# Patient Record
Sex: FEMALE | Race: WHITE | NOT HISPANIC OR LATINO | Employment: FULL TIME | URBAN - METROPOLITAN AREA
[De-identification: names, ages, dates, MRNs, and addresses within clinical notes are randomized per-mention and may not be internally consistent; named-entity substitution may affect disease eponyms.]

---

## 2018-04-23 ENCOUNTER — TRANSCRIBE ORDERS (OUTPATIENT)
Dept: ADMINISTRATIVE | Age: 28
End: 2018-04-23

## 2018-04-23 ENCOUNTER — APPOINTMENT (OUTPATIENT)
Dept: LAB | Age: 28
End: 2018-04-23
Attending: PREVENTIVE MEDICINE

## 2018-04-23 DIAGNOSIS — Z02.1 PHYSICAL EXAM, PRE-EMPLOYMENT: ICD-10-CM

## 2018-04-23 DIAGNOSIS — Z02.1 PHYSICAL EXAM, PRE-EMPLOYMENT: Primary | ICD-10-CM

## 2018-04-23 PROCEDURE — 36415 COLL VENOUS BLD VENIPUNCTURE: CPT

## 2018-04-23 PROCEDURE — 86480 TB TEST CELL IMMUN MEASURE: CPT

## 2018-04-23 PROCEDURE — 86787 VARICELLA-ZOSTER ANTIBODY: CPT

## 2018-04-24 LAB — VZV IGG SER IA-ACNC: NORMAL

## 2018-04-25 LAB
ANNOTATION COMMENT IMP: NORMAL
GAMMA INTERFERON BACKGROUND BLD IA-ACNC: 0.1 IU/ML
M TB IFN-G BLD-IMP: NEGATIVE
M TB IFN-G CD4+ BCKGRND COR BLD-ACNC: <0.01 IU/ML
M TB IFN-G CD4+ T-CELLS BLD-ACNC: 0.06 IU/ML
MITOGEN IGNF BLD-ACNC: 9.86 IU/ML
QUANTIFERON-TB GOLD IN TUBE: NORMAL
SERVICE CMNT-IMP: NORMAL

## 2018-06-05 ENCOUNTER — OFFICE VISIT (OUTPATIENT)
Dept: OBGYN CLINIC | Facility: CLINIC | Age: 28
End: 2018-06-05
Payer: COMMERCIAL

## 2018-06-05 VITALS
HEIGHT: 62 IN | DIASTOLIC BLOOD PRESSURE: 70 MMHG | SYSTOLIC BLOOD PRESSURE: 124 MMHG | WEIGHT: 144 LBS | BODY MASS INDEX: 26.5 KG/M2

## 2018-06-05 DIAGNOSIS — Z30.41 ENCOUNTER FOR SURVEILLANCE OF CONTRACEPTIVE PILLS: Primary | ICD-10-CM

## 2018-06-05 PROCEDURE — 99202 OFFICE O/P NEW SF 15 MIN: CPT | Performed by: OBSTETRICS & GYNECOLOGY

## 2018-06-05 RX ORDER — NORGESTIMATE AND ETHINYL ESTRADIOL 0.25-0.035
1 KIT ORAL DAILY
COMMUNITY
End: 2018-06-05 | Stop reason: SDUPTHER

## 2018-06-05 RX ORDER — NORGESTIMATE AND ETHINYL ESTRADIOL 0.25-0.035
1 KIT ORAL DAILY
Qty: 84 TABLET | Refills: 2 | Status: SHIPPED | OUTPATIENT
Start: 2018-06-05 | End: 2018-08-23 | Stop reason: SDUPTHER

## 2018-06-05 NOTE — PROGRESS NOTES
Assessment Diagnoses and all orders for this visit:    Encounter for surveillance of contraceptive pills  -     norgestimate-ethinyl estradiol (ORTHO-CYCLEN) 0 25-35 MG-MCG per tablet; Take 1 tablet by mouth daily    Other orders  -     Discontinue: norgestimate-ethinyl estradiol (ORTHO-CYCLEN) 0 25-35 MG-MCG per tablet; Take 1 tablet by mouth daily        Plan  32 y o  female continuing OCP (estrogen/progesterone), no contraindications  Return to office in 5 month(s) for annual exam        Michael Mancini is a 32 y o  female who presents for contraception follow up  Her current contraception is oral contraceptives (estrogen/progesterone)  The patient is happy with this method  She denies headache, nausea, abnormal bleeding and abnormal discharge  The patient is sexually active  She is using birth control for aub and contraception  She gets a headache on the first day of her period, but it goes away without a problem by day 2      h/o abnl pap 2016 - colpo done  Repeat pap 2017 was normal       There is no problem list on file for this patient  Past Medical History:   Diagnosis Date    Abnormal Pap smear of cervix 2016    Kidney infection     Kidney stone     Migraine     with menses    Ovarian cyst        Past Surgical History:   Procedure Laterality Date    BLADDER SURGERY      CARPAL TUNNEL RELEASE      WISDOM TOOTH EXTRACTION         Family History   Problem Relation Age of Onset    Colon cancer Mother     Ovarian cancer Maternal Grandmother     Colon cancer Maternal Grandfather     Heart disease Paternal Grandfather        Social History     Social History    Marital status: Single     Spouse name: N/A    Number of children: N/A    Years of education: N/A     Occupational History    Not on file       Social History Main Topics    Smoking status: Never Smoker    Smokeless tobacco: Never Used    Alcohol use Yes      Comment: socially    Drug use: No    Sexual activity: Yes     Partners: Male     Birth control/ protection: OCP     Other Topics Concern    Not on file     Social History Narrative    No narrative on file          Current Outpatient Prescriptions:     norgestimate-ethinyl estradiol (ORTHO-CYCLEN) 0 25-35 MG-MCG per tablet, Take 1 tablet by mouth daily, Disp: 84 tablet, Rfl: 2    Allergies   Allergen Reactions    Codeine     Penicillins        Review of Systems  Constitutional: no fever, feels well, no tiredness, no recent weight gain or loss  Breasts: no complaints of breast pain, breast lump, or nipple discharge  Gastrointestinal: no complaints nausea, vomiting  Genitourinary : as noted in HPI  Neurological: no complaints of headache    Objective     /70   Ht 5' 1 5" (1 562 m)   Wt 65 3 kg (144 lb)   LMP 05/29/2018 (Exact Date)   Breastfeeding? No   BMI 26 77 kg/m²     GEN: The patient was alert and oriented x3, pleasant well-appearing female in no acute distress     Resp: normal respirations, no signs of distress, CTA b/l  Cards: RRR

## 2018-06-15 ENCOUNTER — TRANSCRIBE ORDERS (OUTPATIENT)
Dept: LAB | Facility: AMBULARY SURGERY CENTER | Age: 28
End: 2018-06-15

## 2018-06-15 ENCOUNTER — APPOINTMENT (OUTPATIENT)
Dept: LAB | Facility: AMBULARY SURGERY CENTER | Age: 28
End: 2018-06-15
Payer: COMMERCIAL

## 2018-06-15 DIAGNOSIS — Z00.8 HEALTH EXAMINATION IN POPULATION SURVEYS: ICD-10-CM

## 2018-06-15 DIAGNOSIS — Z00.8 HEALTH EXAMINATION IN POPULATION SURVEYS: Primary | ICD-10-CM

## 2018-06-15 LAB
CHOLEST SERPL-MCNC: 196 MG/DL (ref 50–200)
EST. AVERAGE GLUCOSE BLD GHB EST-MCNC: 74 MG/DL
HBA1C MFR BLD: 4.2 % (ref 4.2–6.3)
HDLC SERPL-MCNC: 53 MG/DL (ref 40–60)
LDLC SERPL CALC-MCNC: 114 MG/DL (ref 0–100)
NONHDLC SERPL-MCNC: 143 MG/DL
TRIGL SERPL-MCNC: 145 MG/DL

## 2018-06-15 PROCEDURE — 83036 HEMOGLOBIN GLYCOSYLATED A1C: CPT

## 2018-06-15 PROCEDURE — 36415 COLL VENOUS BLD VENIPUNCTURE: CPT

## 2018-06-15 PROCEDURE — 80061 LIPID PANEL: CPT

## 2018-07-06 ENCOUNTER — OFFICE VISIT (OUTPATIENT)
Dept: FAMILY MEDICINE CLINIC | Facility: CLINIC | Age: 28
End: 2018-07-06
Payer: COMMERCIAL

## 2018-07-06 ENCOUNTER — APPOINTMENT (OUTPATIENT)
Dept: LAB | Facility: CLINIC | Age: 28
End: 2018-07-06
Payer: COMMERCIAL

## 2018-07-06 ENCOUNTER — TRANSCRIBE ORDERS (OUTPATIENT)
Dept: LAB | Facility: CLINIC | Age: 28
End: 2018-07-06

## 2018-07-06 VITALS
WEIGHT: 148.8 LBS | BODY MASS INDEX: 26.36 KG/M2 | DIASTOLIC BLOOD PRESSURE: 72 MMHG | HEART RATE: 78 BPM | HEIGHT: 63 IN | SYSTOLIC BLOOD PRESSURE: 104 MMHG | RESPIRATION RATE: 16 BRPM

## 2018-07-06 DIAGNOSIS — R53.83 OTHER FATIGUE: ICD-10-CM

## 2018-07-06 DIAGNOSIS — Z00.00 ENCOUNTER FOR WELL ADULT EXAM WITHOUT ABNORMAL FINDINGS: Primary | ICD-10-CM

## 2018-07-06 LAB
25(OH)D3 SERPL-MCNC: 13.9 NG/ML (ref 30–100)
TSH SERPL DL<=0.05 MIU/L-ACNC: 1.7 UIU/ML (ref 0.36–3.74)

## 2018-07-06 PROCEDURE — 99385 PREV VISIT NEW AGE 18-39: CPT | Performed by: FAMILY MEDICINE

## 2018-07-06 PROCEDURE — 82306 VITAMIN D 25 HYDROXY: CPT

## 2018-07-06 PROCEDURE — 36415 COLL VENOUS BLD VENIPUNCTURE: CPT

## 2018-07-06 PROCEDURE — 84443 ASSAY THYROID STIM HORMONE: CPT

## 2018-07-06 NOTE — PROGRESS NOTES
Miguel Mancuso was seen today for new patient physical     Diagnoses and all orders for this visit:    Encounter for well adult exam without abnormal findings    Other fatigue  -     TSH, 3rd generation with T4 reflex; Future  -     Vitamin D 25 hydroxy; Future      Patient Counseling:  --Nutrition: Stressed importance of moderation in sodium/caffeine intake, saturated fat and cholesterol, caloric balance, sufficient intake of fresh fruits, vegetables, fiber, calcium, iron, and 1 mg of folate supplement per day   --Discussed  calcium supplement, and the daily use of baby aspirin  --Exercise: Stressed the importance of regular exercise  --Substance Abuse: Discussed cessation/primary prevention of tobacco, alcohol, or other drug use; driving or other dangerous activities under the influence; availability of treatment for abuse  --Sexuality: Discussed sexually transmitted diseases, partner selection, use of condoms, avoidance of unintended pregnancy  and contraceptive alternatives  --Injury prevention: Discussed safety belts, safety helmets, smoke detector, smoking near bedding or upholstery  --Dental health: Discussed importance of regular tooth brushing, flossing, and dental visits  --Immunizations reviewed  --Discussed benefits of screening colonoscopy    Chief Complaint   Patient presents with    new patient physical       HPI    Patient here for a comprehensive physical exam The patient reports no problems    Do you take any herbs or supplements that were not prescribed by a doctor? no   Are you taking calcium supplements? no   Are you taking aspirin daily? no  How often do you exercise? 3 times a week - cardio and treadmill  Diet? 2-3 servings of fruits and vegetables  Dental visit: March 2018    Vision test: April 2018- wears contacts     Colonoscopy:  Never had one done      History:  LMP: No LMP recorded  Patient is not currently having periods (Reason: Birth Control)    Last pap date:  Seen in [id number]- Dr Maryjo Moise  Abnormal pap? no  : 0  Para:0      History   Sexual Activity    Sexual activity: Yes    Partners: Male    Birth control/ protection: OCP         Review of Systems   Constitutional: Negative  HENT: Negative  Eyes: Negative  Respiratory: Negative  Cardiovascular: Negative  Gastrointestinal: Negative  Endocrine: Negative  Genitourinary: Negative  Musculoskeletal: Negative  Skin: Negative  Allergic/Immunologic: Negative  Neurological: Negative  Hematological: Negative  Psychiatric/Behavioral: Negative  Family History   Problem Relation Age of Onset    Colon cancer Mother     Ovarian cancer Maternal Grandmother     Colon cancer Maternal Grandfather     Heart disease Paternal Grandfather        Past Medical History:   Diagnosis Date    Abnormal Pap smear of cervix 2016    Kidney infection     Kidney stone     Migraine     with menses    Ovarian cyst          Social History     Social History    Marital status: Single     Spouse name: N/A    Number of children: N/A    Years of education: N/A     Occupational History    Not on file  Social History Main Topics    Smoking status: Never Smoker    Smokeless tobacco: Never Used    Alcohol use Yes      Comment: socially    Drug use: No    Sexual activity: Yes     Partners: Male     Birth control/ protection: OCP     Other Topics Concern    Not on file     Social History Narrative    No narrative on file       Current Outpatient Prescriptions on File Prior to Visit   Medication Sig Dispense Refill    norgestimate-ethinyl estradiol (ORTHO-CYCLEN) 0 25-35 MG-MCG per tablet Take 1 tablet by mouth daily 84 tablet 2     No current facility-administered medications on file prior to visit            Allergies   Allergen Reactions    Codeine     Penicillins          Vitals:    18 0848   BP: 104/72   BP Location: Left arm   Patient Position: Sitting   Cuff Size: Standard Pulse: 78   Resp: 16   Weight: 67 5 kg (148 lb 12 8 oz)   Height: 5' 2 6" (1 59 m)         Physical Exam   Constitutional: She is oriented to person, place, and time  Vital signs are normal  She appears well-developed and well-nourished  HENT:   Head: Normocephalic and atraumatic  Nose: Nose normal    Mouth/Throat: Oropharynx is clear and moist    Eyes: Pupils are equal, round, and reactive to light  Neck: Normal range of motion  Neck supple  No thyromegaly present  Cardiovascular: Normal rate and regular rhythm  No murmur heard  Pulmonary/Chest: Effort normal and breath sounds normal    Abdominal: Soft  Bowel sounds are normal    Musculoskeletal: Normal range of motion  She exhibits no edema or deformity  Neurological: She is alert and oriented to person, place, and time  She has normal reflexes  Skin: Skin is warm  No rash noted  No erythema  Psychiatric: She has a normal mood and affect   Her behavior is normal

## 2018-07-09 DIAGNOSIS — E55.9 VITAMIN D DEFICIENCY: Primary | ICD-10-CM

## 2018-07-09 RX ORDER — ERGOCALCIFEROL 1.25 MG/1
50000 CAPSULE ORAL WEEKLY
Qty: 8 CAPSULE | Refills: 0 | Status: SHIPPED | OUTPATIENT
Start: 2018-07-09 | End: 2019-07-31

## 2018-08-23 DIAGNOSIS — Z30.41 ENCOUNTER FOR SURVEILLANCE OF CONTRACEPTIVE PILLS: ICD-10-CM

## 2018-08-24 RX ORDER — NORGESTIMATE AND ETHINYL ESTRADIOL 0.25-0.035
1 KIT ORAL DAILY
Qty: 84 TABLET | Refills: 3 | Status: SHIPPED | OUTPATIENT
Start: 2018-08-24 | End: 2019-07-30 | Stop reason: SDUPTHER

## 2018-08-24 NOTE — TELEPHONE ENCOUNTER
From: Adrinane Gonzalez  Sent: 8/23/2018 5:53 PM EDT  Subject: Medication Renewal Request    Adrianne Gonzalez would like a refill of the following medications:     norgestimate-ethinyl estradiol (ORTHO-CYCLEN) 0 25-35 MG-MCG per tablet AMANDA Post    Preferred pharmacy: [facility] : [id number]    Comment:

## 2018-11-05 ENCOUNTER — ANNUAL EXAM (OUTPATIENT)
Dept: OBGYN CLINIC | Facility: CLINIC | Age: 28
End: 2018-11-05
Payer: COMMERCIAL

## 2018-11-05 VITALS
HEIGHT: 62 IN | SYSTOLIC BLOOD PRESSURE: 110 MMHG | WEIGHT: 156 LBS | DIASTOLIC BLOOD PRESSURE: 60 MMHG | BODY MASS INDEX: 28.71 KG/M2

## 2018-11-05 DIAGNOSIS — Z30.41 ENCOUNTER FOR SURVEILLANCE OF CONTRACEPTIVE PILLS: ICD-10-CM

## 2018-11-05 DIAGNOSIS — R87.613 HIGH GRADE SQUAMOUS INTRAEPITHELIAL LESION (HGSIL) ON CYTOLOGIC SMEAR OF CERVIX: ICD-10-CM

## 2018-11-05 DIAGNOSIS — Z01.419 WOMEN'S ANNUAL ROUTINE GYNECOLOGICAL EXAMINATION: Primary | ICD-10-CM

## 2018-11-05 DIAGNOSIS — Z12.4 ENCOUNTER FOR SCREENING FOR MALIGNANT NEOPLASM OF CERVIX: ICD-10-CM

## 2018-11-05 PROCEDURE — 87624 HPV HI-RISK TYP POOLED RSLT: CPT | Performed by: OBSTETRICS & GYNECOLOGY

## 2018-11-05 PROCEDURE — 99395 PREV VISIT EST AGE 18-39: CPT | Performed by: OBSTETRICS & GYNECOLOGY

## 2018-11-05 PROCEDURE — G0145 SCR C/V CYTO,THINLAYER,RESCR: HCPCS | Performed by: OBSTETRICS & GYNECOLOGY

## 2018-11-05 NOTE — PROGRESS NOTES
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     1   Routine well woman exam done today  2    Pap and HPV:Pap with HPV was done today  Current ASCCP Guidelines reviewed  3   The patient declined STD testing  No testing performed  Safe sex practices have been discussed  4  The patient is sexually active  She agreeed to continue current contraception and options have been discussed  5  The following were reviewed in today's visit: breast self exam, use and side effects of OCPs, exercise and healthy diet  6  Patient to return to office in 12 months for annual exam      All questions have been answered to her satisfaction  CC:  Annual Gynecologic Examination    HPI: 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  She has the following concerns:  None  Health Maintenance:    She exercises 3 days per week  She wears her seatbelt routinely  She does perform regular monthly self breast exams  She feels safe at home  Patients does follow a balanced diet  Past Medical History:   Diagnosis Date    Abnormal Pap smear of cervix 2016    Kidney infection     Kidney stone     Migraine     with menses    Ovarian cyst        Past Surgical History:   Procedure Laterality Date    BLADDER SURGERY      CARPAL TUNNEL RELEASE      WISDOM TOOTH EXTRACTION         Past OB/Gyn History:  Period Cycle (Days): 28  Period Duration (Days): 4  Period Pattern: Regular  Menstrual Flow: Light  Dysmenorrhea: (!) Mild  Dysmenorrhea Symptoms: HeadachePatient's last menstrual period was 10/16/2018      History of sexually transmitted infection No  Patient is currently sexually active: heterosexual  Birth control:oral contraceptives (estrogen/progesterone)    Last Pap  2017 :  no abnormalities;  2016 HSIL    Family History  Family History   Problem Relation Age of Onset    Colon cancer Mother     Ovarian cancer Maternal Grandmother     Colon cancer Maternal Grandfather     Heart disease Paternal Grandfather        Social History:  Social History     Social History    Marital status: Single     Spouse name: N/A    Number of children: N/A    Years of education: N/A     Occupational History    Not on file  Social History Main Topics    Smoking status: Never Smoker    Smokeless tobacco: Never Used    Alcohol use Yes      Comment: socially    Drug use: No    Sexual activity: Yes     Partners: Male     Birth control/ protection: OCP     Other Topics Concern    Not on file     Social History Narrative    No narrative on file     Presently lives with boyfriend  Patient is co-habitating  Patient is currently employed  Allergies: Allergies   Allergen Reactions    Codeine     Penicillins        Medications:    Current Outpatient Prescriptions:     ergocalciferol (VITAMIN D2) 50,000 units, Take 1 capsule (50,000 Units total) by mouth once a week, Disp: 8 capsule, Rfl: 0    norgestimate-ethinyl estradiol (ORTHO-CYCLEN) 0 25-35 MG-MCG per tablet, Take 1 tablet by mouth daily, Disp: 84 tablet, Rfl: 3    Review of Systems:  Review of Systems   Constitutional: Negative for unexpected weight change  Respiratory: Negative for shortness of breath  Cardiovascular: Negative for chest pain  Gastrointestinal: Negative for abdominal pain, blood in stool, constipation, nausea and vomiting  Genitourinary: Negative for difficulty urinating, dyspareunia, dysuria, frequency, menstrual problem, vaginal bleeding and vaginal discharge  Neurological: Positive for headaches (during menses)  Breasts: No changes      Physical Exam:  /60   Ht 5' 2" (1 575 m)   Wt 70 8 kg (156 lb)   LMP 10/16/2018   BMI 28 53 kg/m²    Physical Exam   Constitutional: She is oriented to person, place, and time  She appears well-developed and well-nourished  Genitourinary: Vagina normal and uterus normal  Pelvic exam was performed with patient supine  There is no rash, tenderness or lesion on the right labia  There is no rash, tenderness or lesion on the left labia  Vagina exhibits rugosity  No tenderness or bleeding in the vagina  No vaginal discharge found  Right adnexum does not display mass, does not display tenderness and does not display fullness  Left adnexum does not display mass, does not display tenderness and does not display fullness  Cervix is nulliparous  Cervix does not exhibit motion tenderness, lesion or polyp  Uterus is mobile  Uterus is not enlarged, tender or irregular (is regular)  Neck: Normal range of motion  No thyromegaly present  Cardiovascular: Normal rate and regular rhythm  No murmur heard  Pulmonary/Chest: Effort normal and breath sounds normal  No respiratory distress  She has no wheezes  Right breast exhibits no inverted nipple, no mass, no nipple discharge, no skin change and no tenderness  Left breast exhibits no inverted nipple, no mass, no nipple discharge, no skin change and no tenderness  Abdominal: Soft  She exhibits no distension  There is no tenderness  There is no guarding  Neurological: She is alert and oriented to person, place, and time  Psychiatric: She has a normal mood and affect   Her behavior is normal

## 2018-11-07 LAB
HPV HR 12 DNA CVX QL NAA+PROBE: NEGATIVE
HPV16 DNA CVX QL NAA+PROBE: NEGATIVE
HPV18 DNA CVX QL NAA+PROBE: NEGATIVE

## 2018-11-08 LAB
LAB AP GYN PRIMARY INTERPRETATION: NORMAL
Lab: NORMAL

## 2019-01-08 ENCOUNTER — OFFICE VISIT (OUTPATIENT)
Dept: URGENT CARE | Facility: CLINIC | Age: 29
End: 2019-01-08
Payer: COMMERCIAL

## 2019-01-08 VITALS
RESPIRATION RATE: 12 BRPM | WEIGHT: 158 LBS | SYSTOLIC BLOOD PRESSURE: 136 MMHG | TEMPERATURE: 98.6 F | HEART RATE: 96 BPM | DIASTOLIC BLOOD PRESSURE: 80 MMHG | HEIGHT: 62 IN | BODY MASS INDEX: 29.08 KG/M2

## 2019-01-08 DIAGNOSIS — S61.219A LACERATION WITHOUT FOREIGN BODY OF UNSPECIFIED FINGER WITHOUT DAMAGE TO NAIL, INITIAL ENCOUNTER: Primary | ICD-10-CM

## 2019-01-08 PROCEDURE — S9088 SERVICES PROVIDED IN URGENT: HCPCS | Performed by: NURSE PRACTITIONER

## 2019-01-08 PROCEDURE — 99203 OFFICE O/P NEW LOW 30 MIN: CPT | Performed by: NURSE PRACTITIONER

## 2019-01-08 PROCEDURE — 90715 TDAP VACCINE 7 YRS/> IM: CPT

## 2019-01-08 PROCEDURE — 12001 RPR S/N/AX/GEN/TRNK 2.5CM/<: CPT | Performed by: NURSE PRACTITIONER

## 2019-01-08 RX ORDER — LIDOCAINE HYDROCHLORIDE 10 MG/ML
5 INJECTION, SOLUTION EPIDURAL; INFILTRATION; INTRACAUDAL; PERINEURAL ONCE
Status: COMPLETED | OUTPATIENT
Start: 2019-01-08 | End: 2019-01-08

## 2019-01-08 RX ADMIN — LIDOCAINE HYDROCHLORIDE 5 ML: 10 INJECTION, SOLUTION EPIDURAL; INFILTRATION; INTRACAUDAL; PERINEURAL at 10:39

## 2019-01-08 NOTE — PATIENT INSTRUCTIONS
Sutures to be removed in 10-14 days   Keep wound clean and dry for 24 hours  Avoid submerging wound until sutures are removed   Follow up with your PCP for suture removal     Care For Your Stitches   WHAT YOU NEED TO KNOW:   Stitches, or sutures, are used to close cuts and wounds on the skin  Stitches need to be removed after your wound has healed  DISCHARGE INSTRUCTIONS:   Return to the emergency department if:   · Your stitches come apart  · Blood soaks through your bandages  · You suddenly cannot move your injured joint  · You have sudden numbness around your wound  · You see red streaks coming from your wound  Contact your healthcare provider if:   · You have a fever and chills  · Your wound is red, warm, swollen, or leaking pus  · There is a bad smell coming from your wound  · You have increased pain in the wound area  · You have questions or concerns about your condition or care  Care for your stitches:  Keep your stitches clean and dry  You may need to cover your stitches with a bandage for 24 to 48 hours, or as directed  Do not bump or hit the suture area, as this could open the wound  Do not trim or shorten the ends of your stitches  If they rub on your clothing, put a gauze bandage between the stitches and your clothes  Clean your wound:  Carefully wash your wound with soap and water  For mouth and lip wounds, rinse your mouth after meals and at bedtime  Ask your healthcare provider what to use to rinse your mouth  If you have a scalp wound, you may gently wash your hair every 2 days with mild shampoo  Do not use hair products, such as hair spray  Help your wound heal:   · Elevate  your wound above the level of your heart as often as you can  This will help decrease swelling and pain  Prop your wound on pillows or blankets to keep it elevated comfortably  · Limit activity  Do not stretch the skin around your wound   This will help prevent bleeding and swelling of the wound area  Follow up with your healthcare provider as directed: You may need to return to have your stitches removed  Write down your questions so you remember to ask them during your visits  © 2017 [employer] Information is for End User's use only and may not be sold, redistributed or otherwise used for commercial purposes  All illustrations and images included in CareNotes® are the copyrighted property of [employer]  or [employer]  The above information is an  only  It is not intended as medical advice for individual conditions or treatments  Talk to your doctor, nurse or pharmacist before following any medical regimen to see if it is safe and effective for you

## 2019-01-08 NOTE — PROGRESS NOTES
[facility]        NAME: Elke Ramirez is a 25 y o  female  : 1990    MRN: [id number]  DATE: 2019  TIME: 12:37 PM    Assessment and Plan   Laceration without foreign body of unspecified finger without damage to nail, initial encounter [S61 219A]  1  Laceration without foreign body of unspecified finger without damage to nail, initial encounter  TDAP Vaccine greater than or equal to 6yo    lidocaine (PF) (XYLOCAINE-MPF) 1 % injection 5 mL         Patient Instructions   Sutures to be removed in 10-14 days   Keep wound clean and dry for 24 hours  Avoid submerging wound until sutures are removed   Follow up with your PCP for suture removal     Tetanus updated by RN    Follow up with PCP in 3-5 days  Proceed to  ER if symptoms worsen  Chief Complaint     Chief Complaint   Patient presents with    Hand Laceration     opening package with a knife, R middle finger, unsure of last Tdap         History of Present Illness       24 y/o female presents with right 3rd digit laceration  She reports that she cut herself at approximately 0830 this morning with a pocket knife while trying to open a package  She denies numbness, tingling, or weakness  Bleeding is controlled  Last tetanus was >5 years ago  Review of Systems   Review of Systems   Constitutional: Negative for activity change, chills and fever  Musculoskeletal: Negative for joint swelling  Skin: Positive for wound  Negative for color change  Neurological: Negative for weakness and numbness  Current Medications       Current Outpatient Prescriptions:     ergocalciferol (VITAMIN D2) 50,000 units, Take 1 capsule (50,000 Units total) by mouth once a week, Disp: 8 capsule, Rfl: 0    norgestimate-ethinyl estradiol (ORTHO-CYCLEN) 0 25-35 MG-MCG per tablet, Take 1 tablet by mouth daily, Disp: 84 tablet, Rfl: 3  No current facility-administered medications for this visit       Current Allergies     Allergies as of 01/08/2019 - Reviewed 01/08/2019   Allergen Reaction Noted    Codeine  06/05/2018    Penicillins  06/05/2018            The following portions of the patient's history were reviewed and updated as appropriate: allergies, current medications, past family history, past medical history, past social history, past surgical history and problem list      Past Medical History:   Diagnosis Date    Abnormal Pap smear of cervix 2016    Kidney infection     Kidney stone     Migraine     with menses    Ovarian cyst        Past Surgical History:   Procedure Laterality Date    BLADDER SURGERY      CARPAL TUNNEL RELEASE      WISDOM TOOTH EXTRACTION         Family History   Problem Relation Age of Onset    Colon cancer Mother     Ovarian cancer Maternal Grandmother     Colon cancer Maternal Grandfather     Heart disease Paternal Grandfather          Medications have been verified  Objective   /80 (Patient Position: Sitting)   Pulse 96   Temp 98 6 °F (37 °C) (Oral)   Resp 12   Ht 5' 2" (1 575 m)   Wt 71 7 kg (158 lb)   BMI 28 90 kg/m²        Physical Exam     Physical Exam   Constitutional: She is oriented to person, place, and time  Vital signs are normal  She appears well-developed and well-nourished  She is active  No distress  Cardiovascular: S1 normal, S2 normal and normal heart sounds  Pulmonary/Chest: Breath sounds normal    Musculoskeletal: Normal range of motion  Neurological: She is alert and oriented to person, place, and time  She has normal strength  No sensory deficit  Skin: Skin is warm and dry  Laceration noted  Laceration repair  Date/Time: 1/8/2019 12:40 PM  Performed by: Umu Loza  Authorized by: Umu Loza   Consent: Verbal consent obtained    Consent given by: patient  Patient understanding: patient states understanding of the procedure being performed  Patient identity confirmed: verbally with patient  Body area: upper extremity  Location details: right long finger  Laceration length: 1 5 cm  Foreign bodies: no foreign bodies  Tendon involvement: none  Nerve involvement: none  Vascular damage: no  Anesthesia: local infiltration    Anesthesia:  Local Anesthetic: lidocaine 1% without epinephrine  Anesthetic total: 1 mL    Sedation:  Patient sedated: no    Wound Dehiscence:  Superficial Wound Dehiscence: simple closure      Procedure Details:  Irrigation solution: saline  Irrigation method: syringe  Amount of cleaning: standard  Debridement: none  Degree of undermining: none  Skin closure: 5-0 nylon and Ethilon  Number of sutures: 3  Technique: simple  Approximation: close  Approximation difficulty: simple  Dressing: 4x4 sterile gauze, antibiotic ointment and gauze roll

## 2019-01-18 ENCOUNTER — OFFICE VISIT (OUTPATIENT)
Dept: URGENT CARE | Age: 29
End: 2019-01-18
Payer: COMMERCIAL

## 2019-01-18 VITALS
RESPIRATION RATE: 18 BRPM | SYSTOLIC BLOOD PRESSURE: 150 MMHG | TEMPERATURE: 98.3 F | DIASTOLIC BLOOD PRESSURE: 80 MMHG | HEART RATE: 97 BPM | OXYGEN SATURATION: 97 %

## 2019-01-18 DIAGNOSIS — Z48.02 ENCOUNTER FOR REMOVAL OF SUTURES: Primary | ICD-10-CM

## 2019-01-18 PROCEDURE — S9088 SERVICES PROVIDED IN URGENT: HCPCS | Performed by: FAMILY MEDICINE

## 2019-01-18 PROCEDURE — 99212 OFFICE O/P EST SF 10 MIN: CPT | Performed by: FAMILY MEDICINE

## 2019-01-18 NOTE — PROGRESS NOTES
[facility]        NAME: Norma Pruett is a 25 y o  female  : 1990    MRN: [id number]  DATE: 2019  TIME: 4:09 PM    Assessment and Plan   Encounter for removal of sutures [Z48 02]  1  Encounter for removal of sutures  Suture removal         Patient Instructions     Patient Instructions   3 sutures removed here today  Follow up with PCP as needed  Chief Complaint     Chief Complaint   Patient presents with    Suture / Staple Removal     right middle finger  History of Present Illness   Norma Pruett presents to the clinic c/o    She this is a 49-year-old female here today for suture removal   She had laceration to the right pointer finger about 10 days ago  Three sutures were since started at that time  She denies any numbness or tingling of the feet  She has full range of motion  No fevers body aches chills no sign of infection  Review of Systems   Review of Systems   Constitutional: Negative  HENT: Negative  Respiratory: Negative  Skin: Positive for wound  Psychiatric/Behavioral: Negative            Current Medications     Long-Term Prescriptions   Medication Sig Dispense Refill    ergocalciferol (VITAMIN D2) 50,000 units Take 1 capsule (50,000 Units total) by mouth once a week 8 capsule 0    norgestimate-ethinyl estradiol (ORTHO-CYCLEN) 0 25-35 MG-MCG per tablet Take 1 tablet by mouth daily 84 tablet 3       Current Allergies     Allergies as of 2019 - Reviewed 2019   Allergen Reaction Noted    Codeine  2018    Penicillins  2018            The following portions of the patient's history were reviewed and updated as appropriate: allergies, current medications, past family history, past medical history, past social history, past surgical history and problem list     Objective   /80 (BP Location: Left arm, Patient Position: Sitting)   Pulse 97   Temp 98 3 °F (36 8 °C) (Temporal)   Resp 18   LMP 2019 SpO2 97%        Physical Exam     Physical Exam   Constitutional: She is oriented to person, place, and time  She appears well-developed and well-nourished  Pulmonary/Chest: Effort normal    Neurological: She is alert and oriented to person, place, and time  Skin: Skin is warm and dry  Right pointer finger:  3 sutures intact at the distal aspect of the finger  No redness or drainage  Full range of motion  Psychiatric: She has a normal mood and affect  Her behavior is normal    Nursing note and vitals reviewed  Suture removal  Date/Time: 1/18/2019 4:08 PM  Performed by: Anmol Mendiola by: Shellie Agudelo     Patient location:  Clinic  Consent:     Consent obtained:  Verbal    Consent given by:  Patient  Location:     Laterality:  Right    Location:  Upper extremity    Upper extremity location:  Hand    Hand location:  R index finger  Procedure details: Tools used:  Suture removal kit    Number of sutures removed:  3  Post-procedure details:     Post-removal:  No dressing applied    Patient tolerance of procedure:   Tolerated well, no immediate complications

## 2019-03-28 ENCOUNTER — OFFICE VISIT (OUTPATIENT)
Dept: OBGYN CLINIC | Facility: CLINIC | Age: 29
End: 2019-03-28
Payer: COMMERCIAL

## 2019-03-28 VITALS
SYSTOLIC BLOOD PRESSURE: 122 MMHG | WEIGHT: 159.4 LBS | DIASTOLIC BLOOD PRESSURE: 68 MMHG | HEIGHT: 62 IN | BODY MASS INDEX: 29.33 KG/M2

## 2019-03-28 DIAGNOSIS — R10.2 PELVIC PAIN: Primary | ICD-10-CM

## 2019-03-28 PROCEDURE — 99214 OFFICE O/P EST MOD 30 MIN: CPT | Performed by: OBSTETRICS & GYNECOLOGY

## 2019-03-28 NOTE — PROGRESS NOTES
Assessment Diagnoses and all orders for this visit:    Pelvic pain  -     US pelvis complete w transvaginal; Future     Pain is resolving  Will wait for ultrasound result  Is aware that if it is a simple cyst that she has, we will still proceed with expectant management depending on size of cyst    Subjective     Bibiana Jorge is a 25 y o  female here for a problem visit  Patient is complaining of pelvic pain  Sx's started 2 weeks ago  Patient reports that sx's are not related to her menses  LMP 3/5/19, and she is on ortho cyclen for contraception  Is currently sexually active and has not missed any pills  Has history of ovarian cysts on right side, and feels the same  Has taken motrin for the pain which relieves the pain  Pain is getting better and she denies nausea/vomitting  Has not had to call off work for the pain  Has had open surgery on bladder at the age of 4 after kidney infection    There is no problem list on file for this patient  Gynecologic History  Patient's last menstrual period was 03/05/2019  Contraception: OCP (estrogen/progesterone)    The following portions of the patient's history were reviewed and updated as appropriate: allergies, current medications, past family history, past medical history, past social history, past surgical history and problem list     Review of Systems  Pertinent items are noted in HPI  Objective    /68 (BP Location: Right arm, Patient Position: Sitting, Cuff Size: Standard)   Ht 5' 2" (1 575 m)   Wt 72 3 kg (159 lb 6 4 oz)   LMP 03/05/2019   BMI 29 15 kg/m²    GEN: The patient was alert and oriented x3, pleasant well-appearing female in no acute distress  PELVIC:  Normal appearing external female genitalia, normal vaginal epithelium, No discharge  Cervix present   Bimanual: absent CMT,  normal uterus, non-tender  No palpable adnexal masses

## 2019-04-01 ENCOUNTER — HOSPITAL ENCOUNTER (OUTPATIENT)
Dept: ULTRASOUND IMAGING | Facility: HOSPITAL | Age: 29
Discharge: HOME | End: 2019-04-01
Attending: OBSTETRICS & GYNECOLOGY
Payer: COMMERCIAL

## 2019-04-01 DIAGNOSIS — R10.2 PELVIC PAIN: ICD-10-CM

## 2019-04-01 PROCEDURE — 76856 US EXAM PELVIC COMPLETE: CPT

## 2019-04-01 PROCEDURE — 76830 TRANSVAGINAL US NON-OB: CPT

## 2019-07-29 DIAGNOSIS — Z30.41 ENCOUNTER FOR SURVEILLANCE OF CONTRACEPTIVE PILLS: ICD-10-CM

## 2019-07-29 RX ORDER — NORGESTIMATE AND ETHINYL ESTRADIOL 0.25-0.035
1 KIT ORAL DAILY
Qty: 84 TABLET | Refills: 0 | Status: CANCELLED | OUTPATIENT
Start: 2019-07-29

## 2019-07-30 DIAGNOSIS — Z30.41 ENCOUNTER FOR SURVEILLANCE OF CONTRACEPTIVE PILLS: ICD-10-CM

## 2019-07-30 NOTE — TELEPHONE ENCOUNTER
Pt's refill called into [facility]; 3 month script with 0 refills  Pt will call to schedule her annual exam  Pt is aware that no more refills can be given until she is seen by a doctor

## 2019-07-31 ENCOUNTER — OFFICE VISIT (OUTPATIENT)
Dept: FAMILY MEDICINE CLINIC | Facility: CLINIC | Age: 29
End: 2019-07-31
Payer: COMMERCIAL

## 2019-07-31 VITALS
DIASTOLIC BLOOD PRESSURE: 92 MMHG | TEMPERATURE: 99.1 F | OXYGEN SATURATION: 99 % | BODY MASS INDEX: 28.35 KG/M2 | SYSTOLIC BLOOD PRESSURE: 132 MMHG | HEIGHT: 63 IN | RESPIRATION RATE: 18 BRPM | WEIGHT: 160 LBS | HEART RATE: 89 BPM

## 2019-07-31 DIAGNOSIS — Z00.00 ANNUAL PHYSICAL EXAM: Primary | ICD-10-CM

## 2019-07-31 PROCEDURE — 99395 PREV VISIT EST AGE 18-39: CPT | Performed by: FAMILY MEDICINE

## 2019-07-31 NOTE — PATIENT INSTRUCTIONS

## 2019-07-31 NOTE — PROGRESS NOTES
ADULT ANNUAL PHYSICAL  [facility]    NAME: Brianda Monique  AGE: 25 y o  SEX: female  : 1990     DATE: 2019     Assessment and Plan:     Problem List Items Addressed This Visit     None      Visit Diagnoses     Annual physical exam    -  Primary    Relevant Orders    Hemoglobin A1C    Lipid panel          Immunizations and preventive care screenings were discussed with patient today  Appropriate education was printed on patient's after visit summary  Counseling:  BMI Counseling: Body mass index is 28 53 kg/m²  Discussed the patient's BMI with her  The BMI is above average  BMI counseling and education was provided to the patient  Nutrition recommendations include 3-5 servings of fruits/vegetables daily, decreasing soda and/or juice intake and increasing intake of lean protein  Exercise recommendations include moderate aerobic physical activity for 150 minutes/week  Alcohol/drug use: discussed moderation in alcohol intake and avoidance of illicit drug use  Dental Health: discussed importance of regular tooth brushing, flossing, and dental visits  Injury prevention: discussed safety/seat belts, safety helmets, smoke detectors, carbon dioxide detectors, and smoking near bedding or upholstery  · Sexual health: discussed sexually transmitted diseases, partner selection, use of condoms, avoidance of unintended pregnancy, and contraceptive alternatives  No follow-ups on file  Chief Complaint:     Chief Complaint   Patient presents with    Physical Exam     patient is here for physical exam      History of Present Illness:     Adult Annual Physical   Patient here for a comprehensive physical exam  The patient reports no problems  Diet and Physical Activity  · Diet/Nutrition: well balanced diet  · Exercise: walking        Depression Screening  PHQ-9 Depression Screening    PHQ-9:    Frequency of the following problems over the past two weeks: General Health  · Sleep: sleeps well and gets 7-8 hours of sleep on average  · Hearing: normal - bilateral   · Vision: no vision problems and goes for regular eye exams  · Dental: regular dental visits and brushes teeth twice daily  /GYN Health  · Last menstrual period: regular periods   · Contraceptive method: oral contraceptives  · History of STDs?: no      Review of Systems:     Review of Systems   Constitutional: Negative  HENT: Negative  Eyes: Negative  Respiratory: Negative  Cardiovascular: Negative  Gastrointestinal: Negative  Endocrine: Negative  Genitourinary: Negative  Musculoskeletal: Negative  Skin: Negative  Allergic/Immunologic: Negative  Neurological: Negative  Hematological: Negative  Psychiatric/Behavioral: Negative         Past Medical History:     Past Medical History:   Diagnosis Date    Abnormal Pap smear of cervix 2016    CTS (carpal tunnel syndrome) 2016    Surgery september 2016    Kidney infection     Kidney stone     Migraine     with menses    Ovarian cyst       Past Surgical History:     Past Surgical History:   Procedure Laterality Date    BLADDER SURGERY      CARPAL TUNNEL RELEASE      COLPOSCOPY  2016    Biopsy normal    WISDOM TOOTH EXTRACTION        Social History:     Social History     Socioeconomic History    Marital status: Single     Spouse name: None    Number of children: None    Years of education: None    Highest education level: None   Occupational History    None   Social Needs    Financial resource strain: None    Food insecurity:     Worry: None     Inability: None    Transportation needs:     Medical: None     Non-medical: None   Tobacco Use    Smoking status: Never Smoker    Smokeless tobacco: Never Used   Substance and Sexual Activity    Alcohol use: Yes     Types: 1 Glasses of wine per week     Comment: socially    Drug use: No    Sexual activity: Yes     Partners: Male     Birth control/protection: OCP   Lifestyle    Physical activity:     Days per week: None     Minutes per session: None    Stress: None   Relationships    Social connections:     Talks on phone: None     Gets together: None     Attends Orthodox service: None     Active member of club or organization: None     Attends meetings of clubs or organizations: None     Relationship status: None    Intimate partner violence:     Fear of current or ex partner: None     Emotionally abused: None     Physically abused: None     Forced sexual activity: None   Other Topics Concern    None   Social History Narrative    None      Family History:     Family History   Problem Relation Age of Onset    Colon cancer Mother     Ovarian cancer Maternal Grandmother     Colon cancer Maternal Grandfather     Heart disease Paternal Grandfather       Current Medications:     Current Outpatient Medications   Medication Sig Dispense Refill    SPRINTEC 28 0 25-35 MG-MCG per tablet TAKE ONE TABLET BY MOUTH EVERY DAY 84 tablet 0     No current facility-administered medications for this visit  Allergies: Allergies   Allergen Reactions    Codeine     Penicillins       Physical Exam:     /92 (BP Location: Right arm, Patient Position: Sitting, Cuff Size: Standard)   Pulse 89   Temp 99 1 °F (37 3 °C) (Tympanic)   Resp 18   Ht 5' 2 8" (1 595 m)   Wt 72 6 kg (160 lb)   SpO2 99%   BMI 28 53 kg/m²     Physical Exam   Constitutional: She is oriented to person, place, and time  Vital signs are normal  She appears well-developed and well-nourished  HENT:   Head: Normocephalic and atraumatic  Nose: Nose normal    Mouth/Throat: Oropharynx is clear and moist    Eyes: Pupils are equal, round, and reactive to light  Neck: Normal range of motion  Neck supple  No thyromegaly present  Cardiovascular: Normal rate and regular rhythm  No murmur heard  Pulmonary/Chest: Effort normal and breath sounds normal    Abdominal: Soft   Bowel sounds are normal    Musculoskeletal: Normal range of motion  She exhibits no edema or deformity  Neurological: She is alert and oriented to person, place, and time  She has normal reflexes  Skin: Skin is warm  No rash noted  No erythema  Psychiatric: She has a normal mood and affect   Her behavior is normal        Nabila Gomez MD   [address] [facility]

## 2019-08-29 ENCOUNTER — APPOINTMENT (OUTPATIENT)
Dept: LAB | Facility: AMBULARY SURGERY CENTER | Age: 29
End: 2019-08-29
Payer: COMMERCIAL

## 2019-08-29 DIAGNOSIS — R79.9 ABNORMAL BLOOD CELL COUNT: Primary | ICD-10-CM

## 2019-08-29 LAB
BASOPHILS # BLD AUTO: 0.03 THOUSANDS/ΜL (ref 0–0.1)
BASOPHILS NFR BLD AUTO: 1 % (ref 0–1)
CHOLEST SERPL-MCNC: 197 MG/DL (ref 50–200)
EOSINOPHIL # BLD AUTO: 0.04 THOUSAND/ΜL (ref 0–0.61)
EOSINOPHIL NFR BLD AUTO: 1 % (ref 0–6)
ERYTHROCYTE [DISTWIDTH] IN BLOOD BY AUTOMATED COUNT: 12.8 % (ref 11.6–15.1)
EST. AVERAGE GLUCOSE BLD GHB EST-MCNC: 62 MG/DL
HBA1C MFR BLD: 3.8 % (ref 4.2–6.3)
HCT VFR BLD AUTO: 44.3 % (ref 34.8–46.1)
HDLC SERPL-MCNC: 46 MG/DL (ref 40–60)
HGB BLD-MCNC: 15.4 G/DL (ref 11.5–15.4)
IMM GRANULOCYTES # BLD AUTO: 0.01 THOUSAND/UL (ref 0–0.2)
IMM GRANULOCYTES NFR BLD AUTO: 0 % (ref 0–2)
LDLC SERPL CALC-MCNC: 125 MG/DL (ref 0–100)
LYMPHOCYTES # BLD AUTO: 1.52 THOUSANDS/ΜL (ref 0.6–4.47)
LYMPHOCYTES NFR BLD AUTO: 26 % (ref 14–44)
MCH RBC QN AUTO: 31.5 PG (ref 26.8–34.3)
MCHC RBC AUTO-ENTMCNC: 34.8 G/DL (ref 31.4–37.4)
MCV RBC AUTO: 91 FL (ref 82–98)
MONOCYTES # BLD AUTO: 0.4 THOUSAND/ΜL (ref 0.17–1.22)
MONOCYTES NFR BLD AUTO: 7 % (ref 4–12)
NEUTROPHILS # BLD AUTO: 3.83 THOUSANDS/ΜL (ref 1.85–7.62)
NEUTS SEG NFR BLD AUTO: 65 % (ref 43–75)
NONHDLC SERPL-MCNC: 151 MG/DL
NRBC BLD AUTO-RTO: 0 /100 WBCS
PLATELET # BLD AUTO: 264 THOUSANDS/UL (ref 149–390)
PMV BLD AUTO: 12 FL (ref 8.9–12.7)
RBC # BLD AUTO: 4.89 MILLION/UL (ref 3.81–5.12)
TRIGL SERPL-MCNC: 129 MG/DL
WBC # BLD AUTO: 5.83 THOUSAND/UL (ref 4.31–10.16)

## 2019-08-29 PROCEDURE — 85025 COMPLETE CBC W/AUTO DIFF WBC: CPT

## 2019-08-29 PROCEDURE — 83036 HEMOGLOBIN GLYCOSYLATED A1C: CPT | Performed by: FAMILY MEDICINE

## 2019-08-29 PROCEDURE — 36415 COLL VENOUS BLD VENIPUNCTURE: CPT

## 2019-08-29 PROCEDURE — 80061 LIPID PANEL: CPT | Performed by: FAMILY MEDICINE

## 2019-09-12 ENCOUNTER — CONSULT (OUTPATIENT)
Dept: ENDOCRINOLOGY | Facility: CLINIC | Age: 29
End: 2019-09-12
Payer: COMMERCIAL

## 2019-09-12 VITALS
DIASTOLIC BLOOD PRESSURE: 78 MMHG | SYSTOLIC BLOOD PRESSURE: 128 MMHG | WEIGHT: 156.4 LBS | HEART RATE: 80 BPM | BODY MASS INDEX: 28.78 KG/M2 | HEIGHT: 62 IN

## 2019-09-12 DIAGNOSIS — R89.9 ABNORMAL LABORATORY TEST: ICD-10-CM

## 2019-09-12 DIAGNOSIS — E16.2 HYPOGLYCEMIA: Primary | ICD-10-CM

## 2019-09-12 PROCEDURE — 99203 OFFICE O/P NEW LOW 30 MIN: CPT | Performed by: INTERNAL MEDICINE

## 2019-09-12 NOTE — PROGRESS NOTES
Assessment/Plan:    Abnormal A1C - low A1C 8/2019 - 3 8 with EAG 62, asymptomatic  A1C could be falsely low in hemoglobinopathies, anemia or CKD  Patient does not have any of those conditions  It is most likely that this one abnormal A1C just represents a margin of a lab error  In 6/2018 patient's A1C was low normal 4 2 with EAG 74  I do not recommend any further workup at this time  Patient was educated that in case of sweating, palpitations or lightheadedness she might be hypoglycemic and needs to take something sweet such as chocolate or drink juice  She will follow up with endocrinology on as needed basis         Subjective: Patient has no complaints  Patient ID: Shellie Ferrer is a 25 y o  female  HPI  Patient is 25years old female with no past medical history who comes for the evaluation of low A1C  Patient is an employee of [employer] and at her yearly physical exam which includes measurement of A1C she was found to have a low A1C in 8/2019 - 3 8, with estimated average glucose 62 (6/2018 - A1C 4 2 - low normal)  Patient is currently asymptomatic, denies any sweating, heart palpitations or lightheadedness  She is not aware of any family history of hypoglycemias or insulinoma  Growing up she was not aware that her sugars are low, never had any symptoms  She admits to feeling lightheaded approximately 3 times in the past 2 years in the morning after she woke up  Sometimes she gets lightheaded after extensive exercise  Patient denies any smoking, alcohol or drug use  Review of Systems   Constitutional: Negative for appetite change, diaphoresis, fatigue and fever  HENT: Negative for congestion, postnasal drip, sinus pain and sore throat  Eyes: Negative for pain and redness  Respiratory: Negative for cough, chest tightness and shortness of breath  Gastrointestinal: Negative for abdominal distention, abdominal pain, blood in stool, constipation, diarrhea, nausea and vomiting  Endocrine: Negative for polydipsia and polyuria  Genitourinary: Negative for difficulty urinating, dysuria and enuresis  Musculoskeletal: Negative for arthralgias, back pain and myalgias  Neurological: Negative for dizziness, light-headedness and headaches  Psychiatric/Behavioral: Negative for behavioral problems and confusion  All others negative    Objective:    /78   Pulse 80   Ht 5' 2" (1 575 m)   Wt 70 9 kg (156 lb 6 4 oz)   BMI 28 61 kg/m²      Physical Exam   Constitutional: She appears well-developed and well-nourished  HENT:   Head: Normocephalic and atraumatic  Eyes: Pupils are equal, round, and reactive to light  Conjunctivae are normal    Neck: Neck supple  No thyromegaly present  Cardiovascular: Normal rate and regular rhythm  No murmur heard  Pulmonary/Chest: Effort normal and breath sounds normal  No respiratory distress  Abdominal: Soft  She exhibits no distension  There is no tenderness  Musculoskeletal: She exhibits no edema  Neurological: She is alert  Skin: Skin is warm and dry  Psychiatric: She has a normal mood and affect

## 2019-11-11 ENCOUNTER — ANNUAL EXAM (OUTPATIENT)
Dept: OBGYN CLINIC | Facility: CLINIC | Age: 29
End: 2019-11-11
Payer: COMMERCIAL

## 2019-11-11 VITALS
WEIGHT: 154.4 LBS | SYSTOLIC BLOOD PRESSURE: 134 MMHG | BODY MASS INDEX: 28.41 KG/M2 | HEIGHT: 62 IN | DIASTOLIC BLOOD PRESSURE: 84 MMHG

## 2019-11-11 DIAGNOSIS — Z30.41 ENCOUNTER FOR SURVEILLANCE OF CONTRACEPTIVE PILLS: ICD-10-CM

## 2019-11-11 DIAGNOSIS — Z01.419 WOMEN'S ANNUAL ROUTINE GYNECOLOGICAL EXAMINATION: Primary | ICD-10-CM

## 2019-11-11 PROBLEM — N29 NEPHROCALCINOSIS: Status: ACTIVE | Noted: 2019-11-11

## 2019-11-11 PROBLEM — E83.59 NEPHROCALCINOSIS: Status: ACTIVE | Noted: 2019-11-11

## 2019-11-11 PROCEDURE — 99395 PREV VISIT EST AGE 18-39: CPT | Performed by: OBSTETRICS & GYNECOLOGY

## 2019-11-11 RX ORDER — NORGESTIMATE AND ETHINYL ESTRADIOL 0.25-0.035
1 KIT ORAL DAILY
Qty: 84 TABLET | Refills: 3 | Status: SHIPPED | OUTPATIENT
Start: 2019-11-11 | End: 2020-09-22 | Stop reason: SDUPTHER

## 2019-11-11 NOTE — PROGRESS NOTES
ASSESSMENT & PLAN:   Diagnoses and all orders for this visit:    Women's annual routine gynecological examination    Encounter for surveillance of contraceptive pills  -     norgestimate-ethinyl estradiol ([id number] [id number]) 0 25-35 MG-MCG per tablet; Take 1 tablet by mouth daily    The following were reviewed in today's visit: ASCCP guidelines, breast self exam, use and side effects of OCPs, family planning choices, exercise and healthy diet  Patient to return to office in yearly for annual exam      All questions have been answered to her satisfaction  CC:  Annual Gynecologic Examination    HPI: Wilfredo Porter is a 30 y o  Walda Dial who presents for annual gynecologic examination  She has the following concerns: none  Health Maintenance:    She exercises 4 days per week  She wears her seatbelt routinely  She is practicing breast self awareness  She feels safe at home  Patient tries to follow a balanced diet  Past Medical History:   Diagnosis Date    Abnormal Pap smear of cervix 2016    CTS (carpal tunnel syndrome) 2016    Surgery september 2016    Kidney infection     Kidney stone     Migraine     with menses    Ovarian cyst        Past Surgical History:   Procedure Laterality Date    BLADDER SURGERY      CARPAL TUNNEL RELEASE      COLPOSCOPY  2016    Biopsy normal    WISDOM TOOTH EXTRACTION         Past OB/Gyn History:   Patient's last menstrual period was 10/16/2019 (exact date)  History of sexually transmitted infection: No  Patient is currently sexually active     Birth control:oral contraceptives (estrogen/progesterone)    Last Pap  2018 :  no abnormalities;  HPV negative    Family History  Family History   Problem Relation Age of Onset    Colon cancer Mother     Ovarian cancer Maternal Grandmother     Colon cancer Maternal Grandfather     Heart disease Paternal Grandfather        Social History:  Social History     Socioeconomic History    Marital status: Single     Spouse name: Not on file    Number of children: Not on file    Years of education: Not on file    Highest education level: Not on file   Occupational History    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 [address]  [address] resource strain: Not on file    Food insecurity:     Worry: Not on file     Inability: Not on file    Transportation needs:     Medical: Not on file     Non-medical: Not on file   Tobacco Use    Smoking status: Never Smoker    Smokeless tobacco: Never Used   Substance and Sexual Activity    Alcohol use: Yes     Alcohol/week: 1 0 standard drinks     Types: 1 Glasses of wine per week     Comment: socially    Drug use: No    Sexual activity: Yes     Partners: Male     Birth control/protection: OCP   Lifestyle    Physical activity:     Days per week: Not on file     Minutes per session: Not on file    Stress: Not on file   Relationships    Social connections:     Talks on phone: Not on file     Gets together: Not on file     Attends Nondenominational service: Not on file     Active member of club or organization: Not on file     Attends meetings of clubs or organizations: Not on file     Relationship status: Not on file    Intimate partner violence:     Fear of current or ex partner: Not on file     Emotionally abused: Not on file     Physically abused: Not on file     Forced sexual activity: Not on file   Other Topics Concern    Not on file   Social History Narrative    Not on file     Presently lives with boyfriend  Patient is monogamous  Patient is currently employed  Allergies: Allergies   Allergen Reactions    Codeine     Penicillins        Medications:    Current Outpatient Medications:     norgestimate-ethinyl estradiol (SPRINTEC 28) 0 25-35 MG-MCG per tablet, Take 1 tablet by mouth daily, Disp: 84 tablet, Rfl: 3    Review of Systems:  Review of Systems   Constitutional: Negative for unexpected weight change  Respiratory: Negative for shortness of breath      Cardiovascular: Negative for chest pain  Gastrointestinal: Negative for abdominal distention, abdominal pain, blood in stool, constipation, nausea and vomiting  Genitourinary: Positive for urgency  Negative for difficulty urinating, dysuria, frequency, vaginal bleeding and vaginal discharge  Neurological: Negative for headaches  Physical Exam:  /84   Ht 5' 1 5" (1 562 m)   Wt 70 kg (154 lb 6 4 oz)   LMP 10/16/2019 (Exact Date)   Breastfeeding? No   BMI 28 70 kg/m²    Physical Exam   Constitutional: She is oriented to person, place, and time  She appears well-developed and well-nourished  Genitourinary: Vagina normal and uterus normal  Pelvic exam was performed with patient supine  There is no rash, tenderness or lesion on the right labia  There is no rash, tenderness or lesion on the left labia  Vagina exhibits rugosity  No erythema, tenderness or bleeding in the vagina  No vaginal discharge found  Right adnexum does not display mass, does not display tenderness and does not display fullness  Left adnexum does not display mass, does not display tenderness and does not display fullness  Cervix is nulliparous  Cervix exhibits pinkness  Cervix does not exhibit motion tenderness, lesion or polyp  Uterus is mobile  Uterus is not enlarged, tender, exhibiting a mass or irregular (is regular)  Genitourinary Comments: No bladder tenderness  Urethral meatus midline, no masses  Pulmonary/Chest: Effort normal  No respiratory distress  Right breast exhibits no inverted nipple, no mass, no nipple discharge, no skin change and no tenderness  Left breast exhibits no inverted nipple, no mass, no nipple discharge, no skin change and no tenderness  Abdominal: Soft  She exhibits no distension  Neurological: She is alert and oriented to person, place, and time  Psychiatric: She has a normal mood and affect  Her behavior is normal    Vitals reviewed

## 2020-09-22 DIAGNOSIS — Z30.41 ENCOUNTER FOR SURVEILLANCE OF CONTRACEPTIVE PILLS: ICD-10-CM

## 2020-09-22 RX ORDER — NORGESTIMATE AND ETHINYL ESTRADIOL 0.25-0.035
1 KIT ORAL DAILY
Qty: 84 TABLET | Refills: 0 | Status: SHIPPED | OUTPATIENT
Start: 2020-09-22 | End: 2020-11-17 | Stop reason: SDUPTHER

## 2020-09-22 NOTE — TELEPHONE ENCOUNTER
Pt last seen for yearly on 11/11/19 with Dr Yunior Ordonez     Has appt scheduled for yearly 11/17/20 with Dr Yunior Ordonez

## 2020-10-22 ENCOUNTER — OFFICE VISIT (OUTPATIENT)
Dept: FAMILY MEDICINE CLINIC | Facility: CLINIC | Age: 30
End: 2020-10-22
Payer: COMMERCIAL

## 2020-10-22 VITALS
OXYGEN SATURATION: 98 % | RESPIRATION RATE: 16 BRPM | DIASTOLIC BLOOD PRESSURE: 84 MMHG | HEART RATE: 100 BPM | SYSTOLIC BLOOD PRESSURE: 122 MMHG | WEIGHT: 158.4 LBS | TEMPERATURE: 98.5 F | HEIGHT: 62 IN | BODY MASS INDEX: 29.15 KG/M2

## 2020-10-22 DIAGNOSIS — Z11.59 NEED FOR HEPATITIS C SCREENING TEST: ICD-10-CM

## 2020-10-22 DIAGNOSIS — R10.84 ABDOMINAL PAIN, GENERALIZED: ICD-10-CM

## 2020-10-22 DIAGNOSIS — Z00.00 ANNUAL PHYSICAL EXAM: Primary | ICD-10-CM

## 2020-10-22 DIAGNOSIS — Z11.4 SCREENING FOR HIV (HUMAN IMMUNODEFICIENCY VIRUS): ICD-10-CM

## 2020-10-22 PROCEDURE — 99213 OFFICE O/P EST LOW 20 MIN: CPT | Performed by: FAMILY MEDICINE

## 2020-10-22 PROCEDURE — 99395 PREV VISIT EST AGE 18-39: CPT | Performed by: FAMILY MEDICINE

## 2020-10-22 RX ORDER — CETIRIZINE HYDROCHLORIDE 10 MG/1
10 TABLET ORAL DAILY
COMMUNITY

## 2020-10-22 RX ORDER — DICYCLOMINE HYDROCHLORIDE 10 MG/1
CAPSULE ORAL
Qty: 40 CAPSULE | Refills: 1 | Status: SHIPPED | OUTPATIENT
Start: 2020-10-22

## 2020-10-27 ENCOUNTER — LAB (OUTPATIENT)
Dept: LAB | Facility: AMBULARY SURGERY CENTER | Age: 30
End: 2020-10-27
Payer: COMMERCIAL

## 2020-10-27 DIAGNOSIS — Z11.4 SCREENING FOR HIV (HUMAN IMMUNODEFICIENCY VIRUS): ICD-10-CM

## 2020-10-27 DIAGNOSIS — Z00.00 ANNUAL PHYSICAL EXAM: ICD-10-CM

## 2020-10-27 DIAGNOSIS — R10.84 ABDOMINAL PAIN, GENERALIZED: ICD-10-CM

## 2020-10-27 DIAGNOSIS — Z11.59 NEED FOR HEPATITIS C SCREENING TEST: ICD-10-CM

## 2020-10-27 LAB
ALBUMIN SERPL BCP-MCNC: 3.6 G/DL (ref 3.5–5)
ALP SERPL-CCNC: 78 U/L (ref 46–116)
ALT SERPL W P-5'-P-CCNC: 18 U/L (ref 12–78)
ANION GAP SERPL CALCULATED.3IONS-SCNC: 5 MMOL/L (ref 4–13)
AST SERPL W P-5'-P-CCNC: 9 U/L (ref 5–45)
BASOPHILS # BLD AUTO: 0.03 THOUSANDS/ΜL (ref 0–0.1)
BASOPHILS NFR BLD AUTO: 1 % (ref 0–1)
BILIRUB SERPL-MCNC: 0.58 MG/DL (ref 0.2–1)
BUN SERPL-MCNC: 10 MG/DL (ref 5–25)
CALCIUM SERPL-MCNC: 9.3 MG/DL (ref 8.3–10.1)
CHLORIDE SERPL-SCNC: 109 MMOL/L (ref 100–108)
CHOLEST SERPL-MCNC: 200 MG/DL (ref 50–200)
CO2 SERPL-SCNC: 26 MMOL/L (ref 21–32)
CREAT SERPL-MCNC: 0.64 MG/DL (ref 0.6–1.3)
EOSINOPHIL # BLD AUTO: 0.05 THOUSAND/ΜL (ref 0–0.61)
EOSINOPHIL NFR BLD AUTO: 1 % (ref 0–6)
ERYTHROCYTE [DISTWIDTH] IN BLOOD BY AUTOMATED COUNT: 12.6 % (ref 11.6–15.1)
GFR SERPL CREATININE-BSD FRML MDRD: 118 ML/MIN/1.73SQ M
GLUCOSE P FAST SERPL-MCNC: 85 MG/DL (ref 65–99)
HCT VFR BLD AUTO: 43.4 % (ref 34.8–46.1)
HCV AB SER QL: NORMAL
HDLC SERPL-MCNC: 63 MG/DL
HGB BLD-MCNC: 15.6 G/DL (ref 11.5–15.4)
IMM GRANULOCYTES # BLD AUTO: 0.02 THOUSAND/UL (ref 0–0.2)
IMM GRANULOCYTES NFR BLD AUTO: 0 % (ref 0–2)
LDLC SERPL CALC-MCNC: 115 MG/DL (ref 0–100)
LIPASE SERPL-CCNC: 135 U/L (ref 73–393)
LYMPHOCYTES # BLD AUTO: 1.73 THOUSANDS/ΜL (ref 0.6–4.47)
LYMPHOCYTES NFR BLD AUTO: 28 % (ref 14–44)
MCH RBC QN AUTO: 31.9 PG (ref 26.8–34.3)
MCHC RBC AUTO-ENTMCNC: 35.9 G/DL (ref 31.4–37.4)
MCV RBC AUTO: 89 FL (ref 82–98)
MONOCYTES # BLD AUTO: 0.4 THOUSAND/ΜL (ref 0.17–1.22)
MONOCYTES NFR BLD AUTO: 7 % (ref 4–12)
NEUTROPHILS # BLD AUTO: 3.96 THOUSANDS/ΜL (ref 1.85–7.62)
NEUTS SEG NFR BLD AUTO: 63 % (ref 43–75)
NRBC BLD AUTO-RTO: 0 /100 WBCS
PLATELET # BLD AUTO: 302 THOUSANDS/UL (ref 149–390)
PMV BLD AUTO: 11 FL (ref 8.9–12.7)
POTASSIUM SERPL-SCNC: 4.6 MMOL/L (ref 3.5–5.3)
PROT SERPL-MCNC: 7.9 G/DL (ref 6.4–8.2)
RBC # BLD AUTO: 4.89 MILLION/UL (ref 3.81–5.12)
SODIUM SERPL-SCNC: 140 MMOL/L (ref 136–145)
TRIGL SERPL-MCNC: 108 MG/DL
WBC # BLD AUTO: 6.19 THOUSAND/UL (ref 4.31–10.16)

## 2020-10-27 PROCEDURE — 80061 LIPID PANEL: CPT

## 2020-10-27 PROCEDURE — 85025 COMPLETE CBC W/AUTO DIFF WBC: CPT

## 2020-10-27 PROCEDURE — 36415 COLL VENOUS BLD VENIPUNCTURE: CPT

## 2020-10-27 PROCEDURE — 87389 HIV-1 AG W/HIV-1&-2 AB AG IA: CPT

## 2020-10-27 PROCEDURE — 83690 ASSAY OF LIPASE: CPT

## 2020-10-27 PROCEDURE — 86803 HEPATITIS C AB TEST: CPT

## 2020-10-27 PROCEDURE — 80053 COMPREHEN METABOLIC PANEL: CPT

## 2020-10-28 ENCOUNTER — HOSPITAL ENCOUNTER (OUTPATIENT)
Dept: ULTRASOUND IMAGING | Facility: HOSPITAL | Age: 30
Discharge: HOME | End: 2020-10-28
Attending: FAMILY MEDICINE
Payer: COMMERCIAL

## 2020-10-28 DIAGNOSIS — R10.84 ABDOMINAL PAIN, GENERALIZED: ICD-10-CM

## 2020-10-28 LAB — HIV 1+2 AB+HIV1 P24 AG SERPL QL IA: NORMAL

## 2020-10-28 PROCEDURE — 76700 US EXAM ABDOM COMPLETE: CPT

## 2020-11-17 ENCOUNTER — ANNUAL EXAM (OUTPATIENT)
Dept: OBGYN CLINIC | Facility: CLINIC | Age: 30
End: 2020-11-17
Payer: COMMERCIAL

## 2020-11-17 VITALS
WEIGHT: 159 LBS | DIASTOLIC BLOOD PRESSURE: 78 MMHG | SYSTOLIC BLOOD PRESSURE: 128 MMHG | BODY MASS INDEX: 29.45 KG/M2 | TEMPERATURE: 98.5 F

## 2020-11-17 DIAGNOSIS — Z30.41 ENCOUNTER FOR SURVEILLANCE OF CONTRACEPTIVE PILLS: ICD-10-CM

## 2020-11-17 DIAGNOSIS — Z01.419 WOMEN'S ANNUAL ROUTINE GYNECOLOGICAL EXAMINATION: Primary | ICD-10-CM

## 2020-11-17 PROCEDURE — 99395 PREV VISIT EST AGE 18-39: CPT | Performed by: OBSTETRICS & GYNECOLOGY

## 2020-11-17 RX ORDER — NORGESTIMATE AND ETHINYL ESTRADIOL 0.25-0.035
1 KIT ORAL DAILY
Qty: 84 TABLET | Refills: 3 | Status: SHIPPED | OUTPATIENT
Start: 2020-11-17

## 2020-11-27 ENCOUNTER — PATIENT MESSAGE (OUTPATIENT)
Dept: FAMILY MEDICINE CLINIC | Facility: CLINIC | Age: 30
End: 2020-11-27

## 2020-11-27 DIAGNOSIS — R10.9 ABDOMINAL DISCOMFORT: Primary | ICD-10-CM

## 2020-11-27 DIAGNOSIS — R19.7 DIARRHEA, UNSPECIFIED TYPE: ICD-10-CM

## 2020-12-23 ENCOUNTER — IMMUNIZATIONS (OUTPATIENT)
Dept: FAMILY MEDICINE CLINIC | Facility: HOSPITAL | Age: 30
End: 2020-12-23

## 2020-12-23 DIAGNOSIS — Z23 ENCOUNTER FOR IMMUNIZATION: ICD-10-CM

## 2020-12-23 PROCEDURE — 0011A SARS-COV-2 / COVID-19 MRNA VACCINE (MODERNA) 100 MCG: CPT | Performed by: EMERGENCY MEDICINE

## 2020-12-23 PROCEDURE — 91301 SARS-COV-2 / COVID-19 MRNA VACCINE (MODERNA) 100 MCG: CPT | Performed by: EMERGENCY MEDICINE

## 2021-01-13 ENCOUNTER — PREP FOR PROCEDURE (OUTPATIENT)
Dept: GASTROENTEROLOGY | Facility: CLINIC | Age: 31
End: 2021-01-13

## 2021-01-13 ENCOUNTER — OFFICE VISIT (OUTPATIENT)
Dept: GASTROENTEROLOGY | Facility: CLINIC | Age: 31
End: 2021-01-13
Payer: COMMERCIAL

## 2021-01-13 VITALS
DIASTOLIC BLOOD PRESSURE: 70 MMHG | WEIGHT: 164 LBS | HEART RATE: 102 BPM | BODY MASS INDEX: 30.96 KG/M2 | HEIGHT: 61 IN | TEMPERATURE: 98.8 F | SYSTOLIC BLOOD PRESSURE: 120 MMHG

## 2021-01-13 DIAGNOSIS — R10.13 DYSPEPSIA: ICD-10-CM

## 2021-01-13 DIAGNOSIS — R19.4 CHANGE IN BOWEL HABITS: ICD-10-CM

## 2021-01-13 DIAGNOSIS — R10.9 ABDOMINAL DISCOMFORT: Primary | ICD-10-CM

## 2021-01-13 PROCEDURE — 99204 OFFICE O/P NEW MOD 45 MIN: CPT | Performed by: PHYSICIAN ASSISTANT

## 2021-01-13 NOTE — PROGRESS NOTES
[facility] - Outpatient Consultation  Adolfo Portillo 26 y o  female MRN: [id number]  Encounter: [id number]          ASSESSMENT AND PLAN:      1  Abdominal discomfort  2  Change in bowel habits  She reports abdominal cramping and alternating diarrhea and constipation for the past few months  She lacks alarm signs/symptoms like rectal bleeding, abnormal weight loss, anemia  I suspect she has irritable bowel syndrome, but I ordered inflammatory markers and celiac panel to assess for signs of inflammatory bowel disease and celiac disease  She may continue Bentyl as needed for abdominal cramping and use stool softeners/MiraLax as needed for constipation  She can also start a probiotic such as Align or Florastor to support gut health  I gave her information and discussed the low FODMAP diet as well  - Ambulatory referral to Gastroenterology  - C-reactive protein; Future  - Calprotectin,Fecal  - Celiac Disease Antibody Profile; Future    4  Dyspepsia  She does have postprandial abdominal discomfort, bloating, early satiety, heartburn, and occasional nausea  Recent abdominal ultrasound showed no abnormal gallbladder findings  Differential diagnosis includes GERD, gastritis, peptic ulcer disease, H pylori infection, celiac disease, gastroparesis, functional dyspepsia  I recommend EGD with gastric and duodenal biopsies  She may continue Tums or Pepcid as needed  - C-reactive protein; Future  - Calprotectin,Fecal  - Celiac Disease Antibody Profile; Future    Follow-up after EGD with physician  ______________________________________________________________________    HPI:  25-year-old female with history of nephrocalcinosis referred by PCP for abdominal cramping and change in bowel habits  She reports new GI symptoms for the past few months  She has periumbilical and epigastric cramping pain which sometimes occurs after eating    The pain is alleviated with Bentyl which was prescribed by her PCP  She reports alternating bowel habits  She has constipation about twice per week and diarrhea once every 2 weeks  She denies any blood or mucus in the stool  She does occasionally wake up in the middle of the night with pain and has a bowel movement  She denies abnormal weight loss  She also reports occasional nausea when her stomach is upset  She reports early satiety and bloating  She also has heartburn a few times per week  She was taking Tums and Pepcid as needed but this was not providing relief, so she stopped them  She denies dysphagia and odynophagia  She denies NSAIDs  She has never had EGD or colonoscopy before  She denies family history of inflammatory bowel disease, celiac disease, colon cancer  REVIEW OF SYSTEMS:    CONSTITUTIONAL: Denies any fever, chills, rigors, and weight loss  HEENT: No earache or tinnitus  Denies hearing loss or visual disturbances  CARDIOVASCULAR: No chest pain or palpitations  RESPIRATORY: Denies any cough, hemoptysis, shortness of breath or dyspnea on exertion  GASTROINTESTINAL: As noted in the History of Present Illness  GENITOURINARY: No problems with urination  Denies any hematuria or dysuria  NEUROLOGIC: No dizziness or vertigo, denies headaches  MUSCULOSKELETAL: Denies any muscle or joint pain  SKIN: Denies skin rashes or itching  ENDOCRINE: Denies excessive thirst  Denies intolerance to heat or cold  PSYCHOSOCIAL: Denies depression or anxiety  Denies any recent memory loss         Historical Information   Past Medical History:   Diagnosis Date    Abnormal Pap smear of cervix 2016    Allergic     Seasonal allergies to pollen, penicillin, codeine    CTS (carpal tunnel syndrome) 2016    Surgery september 2016    Kidney infection     Kidney stone     Migraine     with menses    Ovarian cyst      Past Surgical History:   Procedure Laterality Date    BLADDER SURGERY      CARPAL TUNNEL RELEASE      COLPOSCOPY  2016    Biopsy normal    WISDOM TOOTH EXTRACTION       Social History   Social History     Substance and Sexual Activity   Alcohol Use Yes    Alcohol/week: 2 0 standard drinks    Types: 1 Glasses of wine, 1 Standard drinks or equivalent per week    Comment: socially     Social History     Substance and Sexual Activity   Drug Use No     Social History     Tobacco Use   Smoking Status Never Smoker   Smokeless Tobacco Never Used     Family History   Problem Relation Age of Onset    Colonic polyp Mother     Ovarian cancer Maternal Grandmother         diagnosed around 62s    Cancer Maternal Grandmother     Colon cancer Maternal Grandfather         diagnosed 76s    Heart disease Paternal Grandfather     Hyperlipidemia Father     No Known Problems Brother        Meds/Allergies       Current Outpatient Medications:     cetirizine (ZyrTEC) 10 mg tablet    dicyclomine (BENTYL) 10 mg capsule    norgestimate-ethinyl estradiol (Sprintec 28) 0 25-35 MG-MCG per tablet    Allergies   Allergen Reactions    Codeine     Penicillins            Objective     Blood pressure 120/70, pulse 102, temperature 98 8 °F (37 1 °C), temperature source Tympanic, height 5' 1" (1 549 m), weight 74 4 kg (164 lb), not currently breastfeeding  Body mass index is 30 99 kg/m²  PHYSICAL EXAM:      General Appearance:   Alert, cooperative, no distress   HEENT:   Normocephalic, atraumatic, anicteric      Neck:  Supple, symmetrical, trachea midline   Lungs:   Clear to auscultation bilaterally; no rales, rhonchi or wheezing; respirations unlabored    Heart[de-identified]   Regular rate and rhythm; no murmur, rub, or gallop     Abdomen:   Soft, non-tender, non-distended; normal bowel sounds; no masses, no organomegaly    Genitalia:   Deferred    Rectal:   Deferred    Extremities:  No cyanosis, clubbing or edema    Pulses:  2+ and symmetric    Skin:  No jaundice, rashes, or lesions    Lymph nodes:  No palpable cervical lymphadenopathy        Lab Results:   No visits with results within 1 Day(s) from this visit  Latest known visit with results is:   Lab on 10/27/2020   Component Date Value    Hepatitis C Ab 10/27/2020 Non-reactive     HIV-1/HIV-2 Ab 10/27/2020 Non-Reactive     Sodium 10/27/2020 140     Potassium 10/27/2020 4 6     Chloride 10/27/2020 109*    CO2 10/27/2020 26     ANION GAP 10/27/2020 5     BUN 10/27/2020 10     Creatinine 10/27/2020 0 64     Glucose, Fasting 10/27/2020 85     Calcium 10/27/2020 9 3     AST 10/27/2020 9     ALT 10/27/2020 18     Alkaline Phosphatase 10/27/2020 78     Total Protein 10/27/2020 7 9     Albumin 10/27/2020 3 6     Total Bilirubin 10/27/2020 0 58     eGFR 10/27/2020 118     Cholesterol 10/27/2020 200     Triglycerides 10/27/2020 108     HDL, Direct 10/27/2020 63     LDL Calculated 10/27/2020 115*    WBC 10/27/2020 6 19     RBC 10/27/2020 4 89     Hemoglobin 10/27/2020 15 6*    Hematocrit 10/27/2020 43 4     MCV 10/27/2020 89     MCH 10/27/2020 31 9     MCHC 10/27/2020 35 9     RDW 10/27/2020 12 6     MPV 10/27/2020 11 0     Platelets 91/08/6938 302     nRBC 10/27/2020 0     Neutrophils Relative 10/27/2020 63     Immat GRANS % 10/27/2020 0     Lymphocytes Relative 10/27/2020 28     Monocytes Relative 10/27/2020 7     Eosinophils Relative 10/27/2020 1     Basophils Relative 10/27/2020 1     Neutrophils Absolute 10/27/2020 3 96     Immature Grans Absolute 10/27/2020 0 02     Lymphocytes Absolute 10/27/2020 1 73     Monocytes Absolute 10/27/2020 0 40     Eosinophils Absolute 10/27/2020 0 05     Basophils Absolute 10/27/2020 0 03     Lipase 10/27/2020 135          Radiology Results:   No results found      Answers for HPI/ROS submitted by the patient on 1/6/2021   Abdominal pain  Chronicity: recurrent  Onset: more than 1 month ago  Onset quality: undetermined  Frequency: every several days  Episode duration: 1 days  Progression since onset: waxing and waning  Pain location: RLQ, epigastric region, periumbilical region, suprapubic region, right flank  Pain - numeric: 5/10  Pain quality: aching, cramping, a sensation of fullness  Radiates to: RLQ, epigastric region, periumbilical region, suprapubic region, back, pelvis  anorexia: Yes  arthralgias: No  belching: Yes  constipation: Yes  diarrhea: Yes  dysuria: No  fever: No  flatus: Yes  frequency: No  headaches: Yes  hematochezia: No  hematuria: No  melena: No  myalgias: No  nausea: Yes  weight loss: No  vomiting: No  Aggravated by: nothing  Relieved by: nothing, bowel movements  Diagnostic workup: ultrasound

## 2021-01-13 NOTE — LETTER
January 13, 2021     [address]  [address]  [facility]    Patient: Theodora Izaguirre   YOB: 1990   Date of Visit: 1/13/2021       Dear Dr Marquis Santa: Thank you for referring Theodora zIaguirre to me for evaluation  Below are my notes for this consultation  If you have questions, please do not hesitate to call me  I look forward to following your patient along with you  Sincerely,        Joselyn Leonardo PA-C        CC: No Recipients  Joselyn Leonardo PA-C  1/13/2021  4:12 PM  Sign when Signing Visit  [facility] - Outpatient Consultation  Theodora Izaguirre 26 y o  female MRN: [id number]  Encounter: [id number]          ASSESSMENT AND PLAN:      1  Abdominal discomfort  2  Change in bowel habits  She reports abdominal cramping and alternating diarrhea and constipation for the past few months  She lacks alarm signs/symptoms like rectal bleeding, abnormal weight loss, anemia  I suspect she has irritable bowel syndrome, but I ordered inflammatory markers and celiac panel to assess for signs of inflammatory bowel disease and celiac disease  She may continue Bentyl as needed for abdominal cramping and use stool softeners/MiraLax as needed for constipation  She can also start a probiotic such as Align or Florastor to support gut health  I gave her information and discussed the low FODMAP diet as well  - Ambulatory referral to Gastroenterology  - C-reactive protein; Future  - Calprotectin,Fecal  - Celiac Disease Antibody Profile; Future    4  Dyspepsia  She does have postprandial abdominal discomfort, bloating, early satiety, heartburn, and occasional nausea  Recent abdominal ultrasound showed no abnormal gallbladder findings  Differential diagnosis includes GERD, gastritis, peptic ulcer disease, H pylori infection, celiac disease, gastroparesis, functional dyspepsia  I recommend EGD with gastric and duodenal biopsies   She may continue Tums or Pepcid as needed  - C-reactive protein; Future  - Calprotectin,Fecal  - Celiac Disease Antibody Profile; Future    Follow-up after EGD with physician  ______________________________________________________________________    HPI:  47-year-old female with history of nephrocalcinosis referred by PCP for abdominal cramping and change in bowel habits  She reports new GI symptoms for the past few months  She has periumbilical and epigastric cramping pain which sometimes occurs after eating  The pain is alleviated with Bentyl which was prescribed by her PCP  She reports alternating bowel habits  She has constipation about twice per week and diarrhea once every 2 weeks  She denies any blood or mucus in the stool  She does occasionally wake up in the middle of the night with pain and has a bowel movement  She denies abnormal weight loss  She also reports occasional nausea when her stomach is upset  She reports early satiety and bloating  She also has heartburn a few times per week  She was taking Tums and Pepcid as needed but this was not providing relief, so she stopped them  She denies dysphagia and odynophagia  She denies NSAIDs  She has never had EGD or colonoscopy before  She denies family history of inflammatory bowel disease, celiac disease, colon cancer  REVIEW OF SYSTEMS:    CONSTITUTIONAL: Denies any fever, chills, rigors, and weight loss  HEENT: No earache or tinnitus  Denies hearing loss or visual disturbances  CARDIOVASCULAR: No chest pain or palpitations  RESPIRATORY: Denies any cough, hemoptysis, shortness of breath or dyspnea on exertion  GASTROINTESTINAL: As noted in the History of Present Illness  GENITOURINARY: No problems with urination  Denies any hematuria or dysuria  NEUROLOGIC: No dizziness or vertigo, denies headaches  MUSCULOSKELETAL: Denies any muscle or joint pain  SKIN: Denies skin rashes or itching     ENDOCRINE: Denies excessive thirst  Denies intolerance to heat or cold  PSYCHOSOCIAL: Denies depression or anxiety  Denies any recent memory loss  Historical Information   Past Medical History:   Diagnosis Date    Abnormal Pap smear of cervix 2016    Allergic     Seasonal allergies to pollen, penicillin, codeine    CTS (carpal tunnel syndrome) 2016    Surgery september 2016    Kidney infection     Kidney stone     Migraine     with menses    Ovarian cyst      Past Surgical History:   Procedure Laterality Date    BLADDER SURGERY      CARPAL TUNNEL RELEASE      COLPOSCOPY  2016    Biopsy normal    WISDOM TOOTH EXTRACTION       Social History   Social History     Substance and Sexual Activity   Alcohol Use Yes    Alcohol/week: 2 0 standard drinks    Types: 1 Glasses of wine, 1 Standard drinks or equivalent per week    Comment: socially     Social History     Substance and Sexual Activity   Drug Use No     Social History     Tobacco Use   Smoking Status Never Smoker   Smokeless Tobacco Never Used     Family History   Problem Relation Age of Onset    Colonic polyp Mother     Ovarian cancer Maternal Grandmother         diagnosed around 62s    Cancer Maternal Grandmother     Colon cancer Maternal Grandfather         diagnosed 76s    Heart disease Paternal Grandfather     Hyperlipidemia Father     No Known Problems Brother        Meds/Allergies       Current Outpatient Medications:     cetirizine (ZyrTEC) 10 mg tablet    dicyclomine (BENTYL) 10 mg capsule    norgestimate-ethinyl estradiol (Sprintec 28) 0 25-35 MG-MCG per tablet    Allergies   Allergen Reactions    Codeine     Penicillins            Objective     Blood pressure 120/70, pulse 102, temperature 98 8 °F (37 1 °C), temperature source Tympanic, height 5' 1" (1 549 m), weight 74 4 kg (164 lb), not currently breastfeeding  Body mass index is 30 99 kg/m²          PHYSICAL EXAM:      General Appearance:   Alert, cooperative, no distress   HEENT:   Normocephalic, atraumatic, anicteric      Neck:  Supple, symmetrical, trachea midline   Lungs:   Clear to auscultation bilaterally; no rales, rhonchi or wheezing; respirations unlabored    Heart[de-identified]   Regular rate and rhythm; no murmur, rub, or gallop  Abdomen:   Soft, non-tender, non-distended; normal bowel sounds; no masses, no organomegaly    Genitalia:   Deferred    Rectal:   Deferred    Extremities:  No cyanosis, clubbing or edema    Pulses:  2+ and symmetric    Skin:  No jaundice, rashes, or lesions    Lymph nodes:  No palpable cervical lymphadenopathy        Lab Results:   No visits with results within 1 Day(s) from this visit     Latest known visit with results is:   Lab on 10/27/2020   Component Date Value    Hepatitis C Ab 10/27/2020 Non-reactive     HIV-1/HIV-2 Ab 10/27/2020 Non-Reactive     Sodium 10/27/2020 140     Potassium 10/27/2020 4 6     Chloride 10/27/2020 109*    CO2 10/27/2020 26     ANION GAP 10/27/2020 5     BUN 10/27/2020 10     Creatinine 10/27/2020 0 64     Glucose, Fasting 10/27/2020 85     Calcium 10/27/2020 9 3     AST 10/27/2020 9     ALT 10/27/2020 18     Alkaline Phosphatase 10/27/2020 78     Total Protein 10/27/2020 7 9     Albumin 10/27/2020 3 6     Total Bilirubin 10/27/2020 0 58     eGFR 10/27/2020 118     Cholesterol 10/27/2020 200     Triglycerides 10/27/2020 108     HDL, Direct 10/27/2020 63     LDL Calculated 10/27/2020 115*    WBC 10/27/2020 6 19     RBC 10/27/2020 4 89     Hemoglobin 10/27/2020 15 6*    Hematocrit 10/27/2020 43 4     MCV 10/27/2020 89     MCH 10/27/2020 31 9     MCHC 10/27/2020 35 9     RDW 10/27/2020 12 6     MPV 10/27/2020 11 0     Platelets 75/84/4310 302     nRBC 10/27/2020 0     Neutrophils Relative 10/27/2020 63     Immat GRANS % 10/27/2020 0     Lymphocytes Relative 10/27/2020 28     Monocytes Relative 10/27/2020 7     Eosinophils Relative 10/27/2020 1     Basophils Relative 10/27/2020 1     Neutrophils Absolute 10/27/2020 3 96     Immature Grans Absolute 10/27/2020 0 02     Lymphocytes Absolute 10/27/2020 1 73     Monocytes Absolute 10/27/2020 0 40     Eosinophils Absolute 10/27/2020 0 05     Basophils Absolute 10/27/2020 0 03     Lipase 10/27/2020 135          Radiology Results:   No results found      Answers for HPI/ROS submitted by the patient on 1/6/2021   Abdominal pain  Chronicity: recurrent  Onset: more than 1 month ago  Onset quality: undetermined  Frequency: every several days  Episode duration: 1 days  Progression since onset: waxing and waning  Pain location: RLQ, epigastric region, periumbilical region, suprapubic region, right flank  Pain - numeric: 5/10  Pain quality: aching, cramping, a sensation of fullness  Radiates to: RLQ, epigastric region, periumbilical region, suprapubic region, back, pelvis  anorexia: Yes  arthralgias: No  belching: Yes  constipation: Yes  diarrhea: Yes  dysuria: No  fever: No  flatus: Yes  frequency: No  headaches: Yes  hematochezia: No  hematuria: No  melena: No  myalgias: No  nausea: Yes  weight loss: No  vomiting: No  Aggravated by: nothing  Relieved by: nothing, bowel movements  Diagnostic workup: ultrasound

## 2021-01-13 NOTE — PATIENT INSTRUCTIONS
You can try the low FODMAP diet  You can continue dicyclomine or Bentyl as needed for abdominal cramping  You can take a stool softener such as Colace or laxative like MiraLax as needed for constipation  You can start a probiotic over-the-counter such as Align or Florastor  I would recommend having an upper endoscopy to look into the esophagus, stomach, and 1st part of small intestine to evaluate for acid reflux disease, ulcer, H pylori infection, celiac disease  I recommend checking some blood work and stool studies to rule out other causes for your symptoms  In all likelihood, I suspect her symptoms are due to irritable bowel syndrome    Irritable Bowel Syndrome   AMBULATORY CARE:   Irritable bowel syndrome (IBS)  is a condition that prevents food from moving through your intestines normally  The food may move through too slowly or too quickly  This causes abdominal pain, bloating, increased gas, constipation, or diarrhea  Common symptoms include the following:   · Abdominal pain that disappears after you have a bowel movement    · Abdominal cramps that are worse after you eat    · Gas    · Bloated abdomen    · Diarrhea, constipation, or both     · Feeling like you need to have a bowel movement after you just had one    · Mucus in your bowel movement    · Feeling that you have not completely emptied your bowels after a bowel movement    Seek care immediately if:   · You have severe abdominal pain  · Your bowel movements are dark or have blood in them  Call your doctor if:   · You have a fever  · You have pain in your rectum  · You are losing weight without trying  · Your abdominal pain does not go away, even after treatment  · You have questions or concerns about your condition or care  Medicines:   · Medicines  help you have a bowel movement, soften your bowel movement, or treat diarrhea  You may also need medicine to treat abnormal growth of bacteria or to relax your muscles   Ask you healthcare provider about a taking a daily probiotic  · Take your medicine as directed  Contact your healthcare provider if you think your medicine is not helping or if you have side effects  Tell him of her if you are allergic to any medicine  Keep a list of the medicines, vitamins, and herbs you take  Include the amounts, and when and why you take them  Bring the list or the pill bottles to follow-up visits  Carry your medicine list with you in case of an emergency  Manage your symptoms:   · Eat a variety of healthy foods  Healthy foods include fruits, vegetables, whole-grain breads, low-fat dairy products, beans, lean meats, and fish  You may need to avoid certain foods to decrease your symptoms  · Drink liquids as directed  Ask how much liquid to drink each day and which liquids are best for you  For most people, good liquids to drink are water, juice, and milk  · Exercise regularly  Ask about the best exercise plan for you  Exercise can decrease your blood pressure and improve your health  · Keep a record  for 3 weeks  Include everything you eat and drink and your symptoms  Bring this record with you to your follow-up visits  Follow up with your doctor as directed:  Write down your questions so you remember to ask them during your visits  © Copyright [address] Information is for End User's use only and may not be sold, redistributed or otherwise used for commercial purposes  All illustrations and images included in CareNotes® are the copyrighted property of [employer]  or [address] [place], [place] [address]balbina gabriel   The above information is an  only  It is not intended as medical advice for individual conditions or treatments  Talk to your doctor, nurse or pharmacist before following any medical regimen to see if it is safe and effective for you  EGD scheduled on 2/18/21 with Dr Dubois at [facility]  Theodore Romeo gave patient instructions

## 2021-01-18 ENCOUNTER — LAB (OUTPATIENT)
Dept: LAB | Facility: HOSPITAL | Age: 31
End: 2021-01-18
Payer: COMMERCIAL

## 2021-01-18 DIAGNOSIS — R10.13 DYSPEPSIA: ICD-10-CM

## 2021-01-18 DIAGNOSIS — R10.9 ABDOMINAL DISCOMFORT: ICD-10-CM

## 2021-01-18 DIAGNOSIS — R19.4 CHANGE IN BOWEL HABITS: ICD-10-CM

## 2021-01-18 LAB — CRP SERPL QL: 0.8 MG/L (ref 0–1)

## 2021-01-18 PROCEDURE — 36415 COLL VENOUS BLD VENIPUNCTURE: CPT

## 2021-01-18 PROCEDURE — 82784 ASSAY IGA/IGD/IGG/IGM EACH: CPT

## 2021-01-18 PROCEDURE — 86140 C-REACTIVE PROTEIN: CPT

## 2021-01-18 PROCEDURE — 86255 FLUORESCENT ANTIBODY SCREEN: CPT

## 2021-01-18 PROCEDURE — 83516 IMMUNOASSAY NONANTIBODY: CPT

## 2021-01-19 ENCOUNTER — APPOINTMENT (OUTPATIENT)
Dept: LAB | Facility: HOSPITAL | Age: 31
End: 2021-01-19
Payer: COMMERCIAL

## 2021-01-19 ENCOUNTER — IMMUNIZATIONS (OUTPATIENT)
Dept: FAMILY MEDICINE CLINIC | Facility: HOSPITAL | Age: 31
End: 2021-01-19
Payer: COMMERCIAL

## 2021-01-19 DIAGNOSIS — Z23 ENCOUNTER FOR IMMUNIZATION: Primary | ICD-10-CM

## 2021-01-19 PROCEDURE — 83993 ASSAY FOR CALPROTECTIN FECAL: CPT | Performed by: PHYSICIAN ASSISTANT

## 2021-01-19 PROCEDURE — 91301 SARS-COV-2 / COVID-19 MRNA VACCINE (MODERNA) 100 MCG: CPT

## 2021-01-19 PROCEDURE — 0012A SARS-COV-2 / COVID-19 MRNA VACCINE (MODERNA) 100 MCG: CPT

## 2021-01-20 LAB
ENDOMYSIUM IGA SER QL: NEGATIVE
GLIADIN PEPTIDE IGA SER-ACNC: 5 UNITS (ref 0–19)
GLIADIN PEPTIDE IGG SER-ACNC: 2 UNITS (ref 0–19)
IGA SERPL-MCNC: 371 MG/DL (ref 87–352)
TTG IGA SER-ACNC: <2 U/ML (ref 0–3)
TTG IGG SER-ACNC: <2 U/ML (ref 0–5)

## 2021-01-25 LAB — CALPROTECTIN STL-MCNT: 33 UG/G (ref 0–120)

## 2021-02-15 ENCOUNTER — TELEMEDICINE (OUTPATIENT)
Dept: FAMILY MEDICINE CLINIC | Facility: CLINIC | Age: 31
End: 2021-02-15
Payer: COMMERCIAL

## 2021-02-15 VITALS — HEIGHT: 61 IN | BODY MASS INDEX: 30.21 KG/M2 | WEIGHT: 160 LBS

## 2021-02-15 DIAGNOSIS — F32.A ANXIETY AND DEPRESSION: Primary | ICD-10-CM

## 2021-02-15 DIAGNOSIS — F41.9 ANXIETY AND DEPRESSION: Primary | ICD-10-CM

## 2021-02-15 PROBLEM — R89.9 ABNORMAL LABORATORY TEST: Status: RESOLVED | Noted: 2019-09-12 | Resolved: 2021-02-15

## 2021-02-15 PROCEDURE — 99214 OFFICE O/P EST MOD 30 MIN: CPT | Performed by: FAMILY MEDICINE

## 2021-02-15 RX ORDER — ESCITALOPRAM OXALATE 10 MG/1
TABLET ORAL
Qty: 30 TABLET | Refills: 1 | Status: SHIPPED | OUTPATIENT
Start: 2021-02-15 | End: 2021-03-22 | Stop reason: SDUPTHER

## 2021-02-15 NOTE — ASSESSMENT & PLAN NOTE
Generally feels more anxious than depressed  No clear trigger, though Covid-19 pandemic has been a stressor  Discussed treatment options  Will start lexapro- half tab for first week, then full 10 mg tab nightly     Reviewed med use and side effects  Pt to call if any worsening of mood or bothersome side effects  Follow up in 1 month  She will look into therapist as well either through [facility] or with some of the resources provided in patient instructions    Will also check TSH (last was in 2018- normal)

## 2021-02-15 NOTE — PROGRESS NOTES
Virtual Regular Visit      Assessment/Plan:    Problem List Items Addressed This Visit        Other    Anxiety and depression - Primary     Generally feels more anxious than depressed  No clear trigger, though Covid-19 pandemic has been a stressor  Discussed treatment options  Will start lexapro- half tab for first week, then full 10 mg tab nightly   Reviewed med use and side effects  Pt to call if any worsening of mood or bothersome side effects  Follow up in 1 month  She will look into therapist as well either through EAP or with some of the resources provided in patient instructions    Will also check TSH (last was in 2018- normal)         Relevant Medications    escitalopram (LEXAPRO) 10 mg tablet    Other Relevant Orders    TSH, 3rd generation with Free T4 reflex               Reason for visit is   Chief Complaint   Patient presents with    Depression    Virtual Regular Visit        Encounter provider Fay Tejada MD    Provider located at [address] [place] [id number] [phone]      Recent Visits  No visits were found meeting these conditions  Showing recent visits within past 7 days and meeting all other requirements     Today's Visits  Date Type Provider Dept   02/15/21 Telemedicine Fay Tejada MD [facility] today's visits and meeting all other requirements     Future Appointments  No visits were found meeting these conditions  Showing future appointments within next 150 days and meeting all other requirements        The patient was identified by name and date of birth  Toño Schilling was informed that this is a telemedicine visit and that the visit is being conducted through [facility] and patient was informed that this is a secure, HIPAA-compliant platform  She agrees to proceed     My office door was closed  No one else was in the room    She acknowledged consent and understanding of privacy and security of the video platform  The patient has agreed to participate and understands they can discontinue the visit at any time  Patient is aware this is a billable service  Chief Complaint   Patient presents with    Depression    Virtual Regular Visit       Subjective  Eamon Sood is a 26 y o  female with concern for depression and anxiety  Feels more anxious than depressed  No suicidal thoughts or thoughts of self-harm  Got a promotion recently  No clear trigger that she can identify, though the pandemic has been more of a challenge  She notes she's probably dealt with depression and anxiety since college, but getting to be more of a problem more recently  No panic attacks but has days where anxiety level is higher  Never on medication for anxiety or mood in the past   Considering seeing therapist but not sure where to start w/ finding someone  PHQ-9 Depression Screening    PHQ-9:   Frequency of the following problems over the past two weeks:      Little interest or pleasure in doing things: 3 - nearly every day  Feeling down, depressed, or hopeless: 2 - more than half the days  Trouble falling or staying asleep, or sleeping too much: 1 - several days  Feeling tired or having little energy: 2 - more than half the days  Poor appetite or overeatin - several days  Feeling bad about yourself - or that you are a failure or have let yourself or your family down: 2 - more than half the days  Trouble concentrating on things, such as reading the newspaper or watching television: 2 - more than half the days  Moving or speaking so slowly that other people could have noticed   Or the opposite - being so fidgety or restless that you have been moving around a lot more than usual: 1 - several days  Thoughts that you would be better off dead, or of hurting yourself in some way: 0 - not at all  PHQ-2 Score: 5  PHQ-9 Score: 14       STEPHEN-7 Flowsheet Screening      Most Recent Value   Over the last two weeks, how often have you been bothered by the following problems? Feeling nervous, anxious, or on edge  2   Not being able to stop or control worrying  2   Worrying too much about different things  2   Trouble relaxing   2   Being so restless that it's hard to sit still  2   Becoming easily annoyed or irritable   1   Feeling afraid as if something awful might happen  1   How difficult have these problems made it for you to do your work, take care of things at home, or get along with other people? Very difficult   STEPHEN Score   12              HPI     Past Medical History:   Diagnosis Date    Abnormal Pap smear of cervix 2016    Allergic     Seasonal allergies to pollen, penicillin, codeine    CTS (carpal tunnel syndrome) 2016    Surgery september 2016    Kidney infection     Kidney stone     Migraine     with menses    Ovarian cyst        Past Surgical History:   Procedure Laterality Date    BLADDER SURGERY      CARPAL TUNNEL RELEASE      COLPOSCOPY  2016    Biopsy normal    WISDOM TOOTH EXTRACTION         Current Outpatient Medications   Medication Sig Dispense Refill    cetirizine (ZyrTEC) 10 mg tablet Take 10 mg by mouth daily      dicyclomine (BENTYL) 10 mg capsule One tab every 4-6 hours (before meals) as needed for irritable bowels 40 capsule 1    norgestimate-ethinyl estradiol (Sprintec 28) 0 25-35 MG-MCG per tablet Take 1 tablet by mouth daily 84 tablet 3    escitalopram (LEXAPRO) 10 mg tablet Half tab po nightly x 1 week, then full tab po nightly 30 tablet 1     No current facility-administered medications for this visit  Allergies   Allergen Reactions    Codeine     Penicillins        Review of Systems    Video Exam    Vitals:    02/15/21 0902   Weight: 72 6 kg (160 lb)   Height: 5' 1" (1 549 m)       Physical Exam  Vitals signs and nursing note reviewed  Constitutional:       Appearance: She is well-developed  HENT:      Head: Normocephalic and atraumatic     Eyes: Conjunctiva/sclera: Conjunctivae normal    Pulmonary:      Effort: Pulmonary effort is normal  No respiratory distress  Skin:     Findings: No rash  Neurological:      Mental Status: She is alert and oriented to person, place, and time  Psychiatric:         Behavior: Behavior normal       Comments: Mildly anxious affect            I spent 20 minutes directly with the patient during this visit      [address] acknowledges that she has consented to an online visit or consultation  She understands that the online visit is based solely on information provided by her, and that, in the absence of a face-to-face physical evaluation by the physician, the diagnosis she receives is both limited and provisional in terms of accuracy and completeness  This is not intended to replace a full medical face-to-face evaluation by the physician  Wilfredo Porter understands and accepts these terms

## 2021-02-15 NOTE — PATIENT INSTRUCTIONS
To find therapist:  Can use [facility] EAP program - check on intranet page or call if you need help finding this contact info  Www [employer]  com to search for therapists by zipcode and see profiles    Also see list below:  Counseling services:    [facility]   [address], [address] [facility], [facility], [address] (they have equine therapy too)  8 [facility], [place] [address],  [facility], [address] [facility] [address], [address],  Griselda Settle, [address]    70 [facility], [facility] [address]   [phone]     [address]  [address] [facility], [address]  [address]    162 [facility]   [address], [place], [address] [facility] [address]     1200 [facility]   2102 [facility], [address] [place] [address]     Moe Rdz, NELSON, ASHIAW  (patients age 11-adult)   [address], [address] [facility], [address]   [address]  [facility] , [address]  1000 [facility], [facility], [facility], [address] (specializing in addiction)  [facility], [address]  [address], [facility], [address] [facility]   [place]      [address], [address], [facility], [address] [facility]   [place], [place], [facility], [address] [facility], [address], [facility], [address]  [address] [address], [facility]  1160 [facility], [place], [facility]   306 [facility], [address] [facility] [address] [place] [id number] [phone]

## 2021-02-17 ENCOUNTER — TELEPHONE (OUTPATIENT)
Dept: GASTROENTEROLOGY | Facility: CLINIC | Age: 31
End: 2021-02-17

## 2021-02-17 NOTE — TELEPHONE ENCOUNTER
Called and left message for pt to call office to reschedule appt with dr Kiara Roper on 4/22 due to a change in provider schedule

## 2021-02-24 ENCOUNTER — ANESTHESIA EVENT (OUTPATIENT)
Dept: GASTROENTEROLOGY | Facility: AMBULARY SURGERY CENTER | Age: 31
End: 2021-02-24

## 2021-03-05 ENCOUNTER — LAB (OUTPATIENT)
Dept: LAB | Facility: HOSPITAL | Age: 31
End: 2021-03-05
Payer: COMMERCIAL

## 2021-03-05 DIAGNOSIS — F32.A ANXIETY AND DEPRESSION: ICD-10-CM

## 2021-03-05 DIAGNOSIS — F41.9 ANXIETY AND DEPRESSION: ICD-10-CM

## 2021-03-05 LAB — TSH SERPL DL<=0.05 MIU/L-ACNC: 1.56 UIU/ML (ref 0.34–5.6)

## 2021-03-05 PROCEDURE — 36415 COLL VENOUS BLD VENIPUNCTURE: CPT

## 2021-03-05 PROCEDURE — 84443 ASSAY THYROID STIM HORMONE: CPT

## 2021-03-08 ENCOUNTER — ANESTHESIA (OUTPATIENT)
Dept: GASTROENTEROLOGY | Facility: AMBULARY SURGERY CENTER | Age: 31
End: 2021-03-08

## 2021-03-08 ENCOUNTER — HOSPITAL ENCOUNTER (OUTPATIENT)
Dept: GASTROENTEROLOGY | Facility: AMBULARY SURGERY CENTER | Age: 31
Setting detail: OUTPATIENT SURGERY
Discharge: HOME | End: 2021-03-08
Attending: INTERNAL MEDICINE | Admitting: INTERNAL MEDICINE
Payer: COMMERCIAL

## 2021-03-08 VITALS
RESPIRATION RATE: 18 BRPM | OXYGEN SATURATION: 98 % | BODY MASS INDEX: 28.71 KG/M2 | WEIGHT: 156 LBS | SYSTOLIC BLOOD PRESSURE: 110 MMHG | TEMPERATURE: 98 F | HEART RATE: 104 BPM | HEIGHT: 62 IN | DIASTOLIC BLOOD PRESSURE: 59 MMHG

## 2021-03-08 DIAGNOSIS — R10.9 ABDOMINAL DISCOMFORT: ICD-10-CM

## 2021-03-08 DIAGNOSIS — K20.90 ESOPHAGITIS DETERMINED BY ENDOSCOPY: Primary | ICD-10-CM

## 2021-03-08 LAB
EXT PREGNANCY TEST URINE: NEGATIVE
EXT. CONTROL: NORMAL

## 2021-03-08 PROCEDURE — 43239 EGD BIOPSY SINGLE/MULTIPLE: CPT | Performed by: INTERNAL MEDICINE

## 2021-03-08 PROCEDURE — 88305 TISSUE EXAM BY PATHOLOGIST: CPT | Performed by: PATHOLOGY

## 2021-03-08 PROCEDURE — 81025 URINE PREGNANCY TEST: CPT | Performed by: INTERNAL MEDICINE

## 2021-03-08 RX ORDER — FENTANYL CITRATE 50 UG/ML
INJECTION, SOLUTION INTRAMUSCULAR; INTRAVENOUS AS NEEDED
Status: DISCONTINUED | OUTPATIENT
Start: 2021-03-08 | End: 2021-03-08

## 2021-03-08 RX ORDER — SODIUM CHLORIDE, SODIUM LACTATE, POTASSIUM CHLORIDE, CALCIUM CHLORIDE 600; 310; 30; 20 MG/100ML; MG/100ML; MG/100ML; MG/100ML
INJECTION, SOLUTION INTRAVENOUS CONTINUOUS PRN
Status: DISCONTINUED | OUTPATIENT
Start: 2021-03-08 | End: 2021-03-08

## 2021-03-08 RX ORDER — PROPOFOL 10 MG/ML
INJECTION, EMULSION INTRAVENOUS AS NEEDED
Status: DISCONTINUED | OUTPATIENT
Start: 2021-03-08 | End: 2021-03-08

## 2021-03-08 RX ORDER — OMEPRAZOLE 40 MG/1
40 CAPSULE, DELAYED RELEASE ORAL 2 TIMES DAILY
Qty: 60 CAPSULE | Refills: 3 | Status: SHIPPED | OUTPATIENT
Start: 2021-03-08

## 2021-03-08 RX ADMIN — PROPOFOL 100 MG: 10 INJECTION, EMULSION INTRAVENOUS at 08:38

## 2021-03-08 RX ADMIN — SODIUM CHLORIDE, SODIUM LACTATE, POTASSIUM CHLORIDE, AND CALCIUM CHLORIDE: .6; .31; .03; .02 INJECTION, SOLUTION INTRAVENOUS at 08:30

## 2021-03-08 RX ADMIN — FENTANYL CITRATE 50 MCG: 50 INJECTION, SOLUTION INTRAMUSCULAR; INTRAVENOUS at 08:38

## 2021-03-08 RX ADMIN — PROPOFOL 50 MG: 10 INJECTION, EMULSION INTRAVENOUS at 08:39

## 2021-03-08 NOTE — H&P
History and Physical - [facility]  Siria Peraza 26 y o  female MRN: [id number]                  HPI: Siria Peraza is a 26y o  year old female who presents for dyspepsia and change in bowel habits  REVIEW OF SYSTEMS: Per the HPI, and otherwise unremarkable      Historical Information   Past Medical History:   Diagnosis Date    Abnormal Pap smear of cervix 2016    Allergic     Seasonal allergies to pollen, penicillin, codeine    CTS (carpal tunnel syndrome) 2016    Surgery september 2016    Kidney infection     Kidney stone     Migraine     with menses    Ovarian cyst      Past Surgical History:   Procedure Laterality Date    BLADDER SURGERY      CARPAL TUNNEL RELEASE      COLPOSCOPY  2016    Biopsy normal    WISDOM TOOTH EXTRACTION       Social History   Social History     Substance and Sexual Activity   Alcohol Use Yes    Alcohol/week: 2 0 standard drinks    Types: 1 Glasses of wine, 1 Standard drinks or equivalent per week    Frequency: 2-4 times a month    Drinks per session: 1 or 2    Binge frequency: Never    Comment: socially     Social History     Substance and Sexual Activity   Drug Use No     Social History     Tobacco Use   Smoking Status Never Smoker   Smokeless Tobacco Never Used     Family History   Problem Relation Age of Onset    Colonic polyp Mother     Ovarian cancer Maternal Grandmother         diagnosed around 62s    Cancer Maternal Grandmother     Colon cancer Maternal Grandfather         diagnosed 76s    Heart disease Paternal Grandfather     Hyperlipidemia Father     No Known Problems Brother        Meds/Allergies       Current Outpatient Medications:     cetirizine (ZyrTEC) 10 mg tablet    dicyclomine (BENTYL) 10 mg capsule    escitalopram (LEXAPRO) 10 mg tablet    norgestimate-ethinyl estradiol (Sprintec 28) 0 25-35 MG-MCG per tablet    Allergies   Allergen Reactions    Codeine Hives    Penicillins Hives       Objective     BP 130/78   Pulse 103 Comment: sinus tachy  Temp 98 5 °F (36 9 °C) (Temporal)   Resp 21   Ht 5' 2" (1 575 m)   Wt 70 8 kg (156 lb)   LMP 03/02/2021   SpO2 98%   Breastfeeding No   BMI 28 53 kg/m²       PHYSICAL EXAM    Gen: NAD  CV: RRR  CHEST: Clear  ABD: soft, NT/ND  EXT: no edema      ASSESSMENT/PLAN:  This is a 26y o  year old female here for EGD, and she is stable and optimized for her procedure

## 2021-03-08 NOTE — ANESTHESIA POSTPROCEDURE EVALUATION
Post-Op Assessment Note    CV Status:  Stable    Pain management: adequate     Mental Status:  Alert and awake   Hydration Status:  Euvolemic   PONV Controlled:  Controlled   Airway Patency:  Patent      Post Op Vitals Reviewed: Yes      Staff: CRNA         No complications documented      /55 (03/08/21 0847)    Temp 98 6 °F (37 °C) (03/08/21 0847)    Pulse 95 (03/08/21 0847)   Resp 16 (03/08/21 0847)    SpO2 97 % (03/08/21 0847)

## 2021-03-08 NOTE — ANESTHESIA PREPROCEDURE EVALUATION
Procedure:  EGD    Relevant Problems   /RENAL   (+) Nephrocalcinosis      NEURO/PSYCH   (+) Anxiety and depression        Physical Exam    Airway    Mallampati score: I  TM Distance: >3 FB  Neck ROM: full     Dental   No notable dental hx     Cardiovascular  Cardiovascular exam normal    Pulmonary  Pulmonary exam normal     Other Findings        Anesthesia Plan  ASA Score- 2     Anesthesia Type- IV sedation with anesthesia with ASA Monitors  Additional Monitors:   Airway Plan:           Plan Factors-    Chart reviewed  Patient summary reviewed  Induction- intravenous  Postoperative Plan-     Informed Consent- Anesthetic plan and risks discussed with patient  I personally reviewed this patient with the CRNA  Discussed and agreed on the Anesthesia Plan with the CRNA  Tiffanie Costa

## 2021-03-17 NOTE — TELEPHONE ENCOUNTER
Made second attempt to reach patient to reschedule an appt with dr Marylene Iron on 4/22 due to change in providers schedule   LVM for pt to call office back

## 2021-03-22 ENCOUNTER — TELEMEDICINE (OUTPATIENT)
Dept: FAMILY MEDICINE CLINIC | Facility: CLINIC | Age: 31
End: 2021-03-22
Payer: COMMERCIAL

## 2021-03-22 VITALS — WEIGHT: 156 LBS | BODY MASS INDEX: 28.71 KG/M2 | HEIGHT: 62 IN

## 2021-03-22 DIAGNOSIS — F32.A ANXIETY AND DEPRESSION: ICD-10-CM

## 2021-03-22 DIAGNOSIS — F41.9 ANXIETY AND DEPRESSION: ICD-10-CM

## 2021-03-22 PROCEDURE — 99213 OFFICE O/P EST LOW 20 MIN: CPT | Performed by: FAMILY MEDICINE

## 2021-03-22 RX ORDER — ESCITALOPRAM OXALATE 10 MG/1
10 TABLET ORAL DAILY
Qty: 90 TABLET | Refills: 1 | Status: SHIPPED | OUTPATIENT
Start: 2021-03-22 | End: 2021-10-13 | Stop reason: SDUPTHER

## 2021-03-22 NOTE — PATIENT INSTRUCTIONS
Continue on the lexapro 10 mg daily  I'm glad you are doing so well! Follow up in 3-4 months, sooner if you need anything

## 2021-03-22 NOTE — PROGRESS NOTES
Virtual Regular Visit      Assessment/Plan:    Problem List Items Addressed This Visit        Other    Anxiety and depression     Patient's PHQ9 and GAD7 scores are much improved  She feels well  Continue on lexapro 10 mg daily  Follow up in 3-4 months  Refill sent  She will reach out with any concerns  Reason for visit is   Chief Complaint   Patient presents with    Follow-up    Virtual Regular Visit        Encounter provider Dottie Bolivar MD    Provider located at [address] [place] [address],[address]  [address] [address] [phone]      Recent Visits  No visits were found meeting these conditions  Showing recent visits within past 7 days and meeting all other requirements     Today's Visits  Date Type Provider Dept   03/22/21 Telemedicine Dottie Bolivar MD Pg  [facility] today's visits and meeting all other requirements     Future Appointments  No visits were found meeting these conditions  Showing future appointments within next 150 days and meeting all other requirements        The patient was identified by name and date of birth  Siria Peraza was informed that this is a telemedicine visit and that the visit is being conducted through [facility] and patient was informed that this is a secure, HIPAA-compliant platform  She agrees to proceed     My office door was closed  No one else was in the room  She acknowledged consent and understanding of privacy and security of the video platform  The patient has agreed to participate and understands they can discontinue the visit at any time  Patient is aware this is a billable service  Sara Campbell is a 26 y o  female following up on depression and anxiety  Sincer her last visit one month ago when she was started on lexapro, she notes she is feeling much improved    She notes that the background anxious feeling is gone and even when she has things for work to think about or to-do lists, she is able to manage that and it doesn't feel overwhelming  She isn't overthinking things  Feels like herself again  The first two weeks on the medication she felt a little tired, but now she is feeling well  No GI side effects  Mood is good  Feeling normal range of emotions, doesn't feel blunted or flat  PHQ-9 Depression Screening    PHQ-9:   Frequency of the following problems over the past two weeks:      Little interest or pleasure in doing things: 0 - not at all  Feeling down, depressed, or hopeless: 0 - not at all  Trouble falling or staying asleep, or sleeping too much: 1 - several days  Feeling tired or having little energy: 1 - several days  Poor appetite or overeatin - not at all  Feeling bad about yourself - or that you are a failure or have let yourself or your family down: 0 - not at all  Trouble concentrating on things, such as reading the newspaper or watching television: 0 - not at all  Moving or speaking so slowly that other people could have noticed  Or the opposite - being so fidgety or restless that you have been moving around a lot more than usual: 0 - not at all  Thoughts that you would be better off dead, or of hurting yourself in some way: 0 - not at all  PHQ-2 Score: 0  PHQ-9 Score: 2       STEPHEN-7 Flowsheet Screening      Most Recent Value   Over the last two weeks, how often have you been bothered by the following problems? Feeling nervous, anxious, or on edge  0   Not being able to stop or control worrying  0   Worrying too much about different things  0   Trouble relaxing   0   Being so restless that it's hard to sit still  0   Becoming easily annoyed or irritable   0   Feeling afraid as if something awful might happen  0   How difficult have these problems made it for you to do your work, take care of things at home, or get along with other people?    Not difficult at all   STEPHEN Score   0          HPI     Past Medical History:   Diagnosis Date    Abnormal Pap smear of cervix 2016    Allergic     Seasonal allergies to pollen, penicillin, codeine    Anxiety     CTS (carpal tunnel syndrome) 2016    Surgery september 2016    Depression     Kidney infection     Kidney stone     Migraine     with menses    Ovarian cyst        Past Surgical History:   Procedure Laterality Date    BLADDER SURGERY      CARPAL TUNNEL RELEASE      COLPOSCOPY  2016    Biopsy normal    WISDOM TOOTH EXTRACTION         Current Outpatient Medications   Medication Sig Dispense Refill    cetirizine (ZyrTEC) 10 mg tablet Take 10 mg by mouth daily      dicyclomine (BENTYL) 10 mg capsule One tab every 4-6 hours (before meals) as needed for irritable bowels 40 capsule 1    escitalopram (LEXAPRO) 10 mg tablet Half tab po nightly x 1 week, then full tab po nightly 30 tablet 1    norgestimate-ethinyl estradiol (Sprintec 28) 0 25-35 MG-MCG per tablet Take 1 tablet by mouth daily 84 tablet 3    omeprazole (PriLOSEC) 40 MG capsule Take 1 capsule (40 mg total) by mouth 2 (two) times a day 60 capsule 3     No current facility-administered medications for this visit  Allergies   Allergen Reactions    Codeine Hives    Penicillins Hives       Review of Systems    Video Exam    Vitals:    03/22/21 1013   Weight: 70 8 kg (156 lb)   Height: 5' 2" (1 575 m)       Physical Exam  Vitals signs and nursing note reviewed  Constitutional:       General: She is not in acute distress  Appearance: Normal appearance  She is well-developed  HENT:      Head: Normocephalic and atraumatic  Eyes:      Conjunctiva/sclera: Conjunctivae normal    Pulmonary:      Effort: Pulmonary effort is normal  No respiratory distress  Neurological:      Mental Status: She is alert and oriented to person, place, and time     Psychiatric:         Mood and Affect: Mood normal          Behavior: Behavior normal           I spent 10 minutes directly with the patient during this visit      VIRTUAL VISIT DISCLAIMER    Leslie Fragoso acknowledges that she has consented to an online visit or consultation  She understands that the online visit is based solely on information provided by her, and that, in the absence of a face-to-face physical evaluation by the physician, the diagnosis she receives is both limited and provisional in terms of accuracy and completeness  This is not intended to replace a full medical face-to-face evaluation by the physician  Leslie Fragoso understands and accepts these terms

## 2021-03-22 NOTE — ASSESSMENT & PLAN NOTE
Patient's PHQ9 and GAD7 scores are much improved  She feels well  Continue on lexapro 10 mg daily  Follow up in 3-4 months  Refill sent  She will reach out with any concerns

## 2021-04-22 ENCOUNTER — OFFICE VISIT (OUTPATIENT)
Dept: GASTROENTEROLOGY | Facility: CLINIC | Age: 31
End: 2021-04-22
Payer: COMMERCIAL

## 2021-04-22 VITALS
HEIGHT: 62 IN | HEART RATE: 89 BPM | WEIGHT: 167 LBS | TEMPERATURE: 99 F | BODY MASS INDEX: 30.73 KG/M2 | DIASTOLIC BLOOD PRESSURE: 78 MMHG | SYSTOLIC BLOOD PRESSURE: 130 MMHG

## 2021-04-22 DIAGNOSIS — K21.00 GASTROESOPHAGEAL REFLUX DISEASE WITH ESOPHAGITIS WITHOUT HEMORRHAGE: Primary | ICD-10-CM

## 2021-04-22 DIAGNOSIS — K58.2 IRRITABLE BOWEL SYNDROME WITH BOTH CONSTIPATION AND DIARRHEA: ICD-10-CM

## 2021-04-22 PROCEDURE — 99213 OFFICE O/P EST LOW 20 MIN: CPT | Performed by: PHYSICIAN ASSISTANT

## 2021-04-22 NOTE — PROGRESS NOTES
[facility] - Outpatient Follow-up Note  Jelly Barrios 26 y o  female MRN: [id number]  Encounter: [id number]          ASSESSMENT AND PLAN:      1  Gastroesophageal reflux disease with esophagitis without hemorrhage  Recent EGD showed LA grade A erosive reflux induced esophagitis  Her heartburn, abdominal pain, and bloating have significantly improved with omeprazole 40 mg twice daily  She will complete 8 week course soon  She may then take omeprazole once daily for 2 weeks, then every other day for 2 weeks, then stop completely  If her reflux returns, she was instructed to contact the office for further recommendations  I recommend avoiding/limiting coffee, caffeine, soda, tomatoes, citrus, spicy foods  2  Irritable bowel syndrome with both constipation and diarrhea  Her alternating diarrhea and constipation and abdominal pain have resolved since starting Lexapro  Suspect anxiety was triggering her symptoms  She has not needed dicyclomine in a long time  Follow-up as needed  ______________________________________________________________________    SUBJECTIVE:  20-year-old female presenting for follow-up of dyspepsia and change in bowel habits  She underwent EGD which showed mild esophagitis  Gastric and duodenal biopsies unremarkable  CRP and fecal calprotectin within normal limits  Celiac disease antibody panel was negative  Since our last visit, she was started on Lexapro and Prilosec 40 mg twice daily  She is doing much better overall  She denies any heartburn, nausea, vomiting  Her bloating is improved  She denies abdominal pain  She denies diarrhea and constipation  REVIEW OF SYSTEMS IS OTHERWISE NEGATIVE        Historical Information   Past Medical History:   Diagnosis Date    Abnormal Pap smear of cervix 2016    Allergic     Seasonal allergies to pollen, penicillin, codeine    Anxiety     CTS (carpal tunnel syndrome) 2016    Surgery september 2016   Colleen Garcia Depression     Kidney infection     Kidney stone     Migraine     with menses    Ovarian cyst      Past Surgical History:   Procedure Laterality Date    BLADDER SURGERY      CARPAL TUNNEL RELEASE      COLPOSCOPY  2016    Biopsy normal    UPPER GASTROINTESTINAL ENDOSCOPY      WISDOM TOOTH EXTRACTION       Social History   Social History     Substance and Sexual Activity   Alcohol Use Not Currently    Alcohol/week: 2 0 standard drinks    Types: 2 Standard drinks or equivalent per week    Frequency: 2-4 times a month    Drinks per session: 1 or 2    Binge frequency: Never    Comment: socially     Social History     Substance and Sexual Activity   Drug Use No     Social History     Tobacco Use   Smoking Status Never Smoker   Smokeless Tobacco Never Used     Family History   Problem Relation Age of Onset    Colonic polyp Mother     Ovarian cancer Maternal Grandmother         diagnosed around 62s    Cancer Maternal Grandmother     Colon cancer Maternal Grandfather         diagnosed 76s    Heart disease Paternal Grandfather     Hyperlipidemia Father     No Known Problems Brother        Meds/Allergies       Current Outpatient Medications:     cetirizine (ZyrTEC) 10 mg tablet    dicyclomine (BENTYL) 10 mg capsule    escitalopram (LEXAPRO) 10 mg tablet    norgestimate-ethinyl estradiol (Sprintec 28) 0 25-35 MG-MCG per tablet    omeprazole (PriLOSEC) 40 MG capsule    Allergies   Allergen Reactions    Codeine Hives    Penicillins Hives           Objective     Blood pressure 130/78, pulse 89, temperature 99 °F (37 2 °C), temperature source Tympanic, height 5' 2" (1 575 m), weight 75 8 kg (167 lb), not currently breastfeeding  Body mass index is 30 54 kg/m²        PHYSICAL EXAM:      General Appearance:   Alert, cooperative, no distress   HEENT:   Normocephalic, atraumatic, anicteric      Neck:  Supple, symmetrical, trachea midline   Lungs:   Clear to auscultation bilaterally; no rales, rhonchi or wheezing; respirations unlabored    Heart[de-identified]   Regular rate and rhythm; no murmur, rub, or gallop  Abdomen:   Soft, non-tender, non-distended; normal bowel sounds; no masses, no organomegaly    Genitalia:   Deferred    Rectal:   Deferred    Extremities:  No cyanosis, clubbing or edema    Pulses:  2+ and symmetric    Skin:  No jaundice, rashes, or lesions    Lymph nodes:  No palpable cervical lymphadenopathy        Lab Results:   No visits with results within 1 Day(s) from this visit  Latest known visit with results is:   Hospital Outpatient Visit on 03/08/2021   Component Date Value    EXT Preg Test, Ur 03/08/2021 Negative     Control 03/08/2021 Valid     Case Report 03/08/2021                      Value:Surgical Pathology Report                         Case: [id number]                                   Authorizing Provider:  Wilfredo Cherry DO        Collected:           03/08/2021 [id number]              Ordering Location:     [facility]        Received:            03/08/2021 [address] Endoscopy                                                           Pathologist:           Kristal Carrera MD                                                         Specimens:   A) - Duodenum, duodenum r/o celiac                                                                  B) - Stomach, stomach                                                                      Final Diagnosis 03/08/2021                      Value: This result contains rich text formatting which cannot be displayed here   Additional Information 03/08/2021                      Value: This result contains rich text formatting which cannot be displayed here  Moise Gross Description 03/08/2021                      Value: This result contains rich text formatting which cannot be displayed here  Radiology Results:   No results found      Answers for HPI/ROS submitted by the patient on 4/20/2021   Abdominal pain  Chronicity: recurrent  Onset: more than 1 month ago  Onset quality: gradual  Frequency: every several days  Episode duration: 2 hours  Progression since onset: gradually improving  Pain location: RLQ, RUQ, periumbilical region, right flank  Pain - numeric: 5/10  Pain quality: aching, cramping, sharp  Radiates to: RLQ, RUQ, epigastric region, periumbilical region, suprapubic region, back, right flank, pelvis  anorexia: Yes  arthralgias: No  belching: No  constipation: Yes  diarrhea: Yes  dysuria: No  fever: No  flatus: Yes  frequency: No  headaches: Yes  hematochezia: No  hematuria: No  melena: No  myalgias: No  nausea: Yes  weight loss: No  vomiting: No  Aggravated by: eating  Relieved by: bowel movements, passing flatus, recumbency  Diagnostic workup: ultrasound, upper endoscopy

## 2021-04-22 NOTE — LETTER
April 22, 2021     MD [address]  [address]  [facility]    Patient: Bibiana Jorge   YOB: 1990   Date of Visit: 4/22/2021       Dear Dr Rock Jimenez: Thank you for referring Bibiana Jorge to me for evaluation  Below are my notes for this consultation  If you have questions, please do not hesitate to call me  I look forward to following your patient along with you  Sincerely,        Garrett Figueroa PA-C        CC: No Recipients  Garrett Figueroa PA-C  4/22/2021  4:02 PM  Sign when Signing Visit  [facility] - Outpatient Follow-up Note  Bibiana Jorge 26 y o  female MRN: [id number]  Encounter: [id number]          ASSESSMENT AND PLAN:      1  Gastroesophageal reflux disease with esophagitis without hemorrhage  Recent EGD showed LA grade A erosive reflux induced esophagitis  Her heartburn, abdominal pain, and bloating have significantly improved with omeprazole 40 mg twice daily  She will complete 8 week course soon  She may then take omeprazole once daily for 2 weeks, then every other day for 2 weeks, then stop completely  If her reflux returns, she was instructed to contact the office for further recommendations  I recommend avoiding/limiting coffee, caffeine, soda, tomatoes, citrus, spicy foods  2  Irritable bowel syndrome with both constipation and diarrhea  Her alternating diarrhea and constipation and abdominal pain have resolved since starting Lexapro  Suspect anxiety was triggering her symptoms  She has not needed dicyclomine in a long time  Follow-up as needed  ______________________________________________________________________    SUBJECTIVE:  24-year-old female presenting for follow-up of dyspepsia and change in bowel habits  She underwent EGD which showed mild esophagitis  Gastric and duodenal biopsies unremarkable  CRP and fecal calprotectin within normal limits   Celiac disease antibody panel was negative  Since our last visit, she was started on Lexapro and Prilosec 40 mg twice daily  She is doing much better overall  She denies any heartburn, nausea, vomiting  Her bloating is improved  She denies abdominal pain  She denies diarrhea and constipation  REVIEW OF SYSTEMS IS OTHERWISE NEGATIVE        Historical Information   Past Medical History:   Diagnosis Date    Abnormal Pap smear of cervix 2016    Allergic     Seasonal allergies to pollen, penicillin, codeine    Anxiety     CTS (carpal tunnel syndrome) 2016    Surgery september 2016    Depression     Kidney infection     Kidney stone     Migraine     with menses    Ovarian cyst      Past Surgical History:   Procedure Laterality Date    BLADDER SURGERY      CARPAL TUNNEL RELEASE      COLPOSCOPY  2016    Biopsy normal    UPPER GASTROINTESTINAL ENDOSCOPY      WISDOM TOOTH EXTRACTION       Social History   Social History     Substance and Sexual Activity   Alcohol Use Not Currently    Alcohol/week: 2 0 standard drinks    Types: 2 Standard drinks or equivalent per week    Frequency: 2-4 times a month    Drinks per session: 1 or 2    Binge frequency: Never    Comment: socially     Social History     Substance and Sexual Activity   Drug Use No     Social History     Tobacco Use   Smoking Status Never Smoker   Smokeless Tobacco Never Used     Family History   Problem Relation Age of Onset    Colonic polyp Mother     Ovarian cancer Maternal Grandmother         diagnosed around 62s    Cancer Maternal Grandmother     Colon cancer Maternal Grandfather         diagnosed 76s    Heart disease Paternal Grandfather     Hyperlipidemia Father     No Known Problems Brother        Meds/Allergies       Current Outpatient Medications:     cetirizine (ZyrTEC) 10 mg tablet    dicyclomine (BENTYL) 10 mg capsule    escitalopram (LEXAPRO) 10 mg tablet    norgestimate-ethinyl estradiol (Sprintec 28) 0 25-35 MG-MCG per tablet    omeprazole (PriLOSEC) 40 MG capsule    Allergies   Allergen Reactions    Codeine Hives    Penicillins Hives           Objective     Blood pressure 130/78, pulse 89, temperature 99 °F (37 2 °C), temperature source Tympanic, height 5' 2" (1 575 m), weight 75 8 kg (167 lb), not currently breastfeeding  Body mass index is 30 54 kg/m²  PHYSICAL EXAM:      General Appearance:   Alert, cooperative, no distress   HEENT:   Normocephalic, atraumatic, anicteric      Neck:  Supple, symmetrical, trachea midline   Lungs:   Clear to auscultation bilaterally; no rales, rhonchi or wheezing; respirations unlabored    Heart[de-identified]   Regular rate and rhythm; no murmur, rub, or gallop  Abdomen:   Soft, non-tender, non-distended; normal bowel sounds; no masses, no organomegaly    Genitalia:   Deferred    Rectal:   Deferred    Extremities:  No cyanosis, clubbing or edema    Pulses:  2+ and symmetric    Skin:  No jaundice, rashes, or lesions    Lymph nodes:  No palpable cervical lymphadenopathy        Lab Results:   No visits with results within 1 Day(s) from this visit     Latest known visit with results is:   Hospital Outpatient Visit on 03/08/2021   Component Date Value    EXT Preg Test, Ur 03/08/2021 Negative     Control 03/08/2021 Valid     Case Report 03/08/2021                      Value:Surgical Pathology Report                         Case: [id number]                                   Authorizing Provider:  Storm Stringer DO        Collected:           03/08/2021 [id number]              Ordering Location:     [facility]        Received:            03/08/2021 [address] Endoscopy                                                           Pathologist:           Tang Villarreal MD                                                         Specimens:   A) - Duodenum, duodenum r/o celiac                                                                  B) - Stomach, stomach  Final Diagnosis 03/08/2021                      Value: This result contains rich text formatting which cannot be displayed here   Additional Information 03/08/2021                      Value: This result contains rich text formatting which cannot be displayed here  Izabela Broussard Gross Description 03/08/2021                      Value: This result contains rich text formatting which cannot be displayed here  Radiology Results:   No results found      Answers for HPI/ROS submitted by the patient on 4/20/2021   Abdominal pain  Chronicity: recurrent  Onset: more than 1 month ago  Onset quality: gradual  Frequency: every several days  Episode duration: 2 hours  Progression since onset: gradually improving  Pain location: RLQ, RUQ, periumbilical region, right flank  Pain - numeric: 5/10  Pain quality: aching, cramping, sharp  Radiates to: RLQ, RUQ, epigastric region, periumbilical region, suprapubic region, back, right flank, pelvis  anorexia: Yes  arthralgias: No  belching: No  constipation: Yes  diarrhea: Yes  dysuria: No  fever: No  flatus: Yes  frequency: No  headaches: Yes  hematochezia: No  hematuria: No  melena: No  myalgias: No  nausea: Yes  weight loss: No  vomiting: No  Aggravated by: eating  Relieved by: bowel movements, passing flatus, recumbency  Diagnostic workup: ultrasound, upper endoscopy

## 2021-04-22 NOTE — PATIENT INSTRUCTIONS
When you complete 8 weeks of omeprazole twice daily, decrease to omeprazole once daily for 2 weeks  Then take omeprazole every other day for 2 weeks, then try stopping  If your heartburn returns, call the office and we can give you further instructions

## 2021-06-16 ENCOUNTER — APPOINTMENT (OUTPATIENT)
Dept: LAB | Facility: HOSPITAL | Age: 31
End: 2021-06-16

## 2021-06-16 DIAGNOSIS — Z00.8 HEALTH EXAMINATION IN POPULATION SURVEY: ICD-10-CM

## 2021-06-16 LAB
CHOLEST SERPL-MCNC: 225 MG/DL
EST. AVERAGE GLUCOSE BLD GHB EST-MCNC: 82 MG/DL
HBA1C MFR BLD: 4.5 %
HDLC SERPL-MCNC: 59 MG/DL
LDLC SERPL CALC-MCNC: 136 MG/DL (ref 0–100)
NONHDLC SERPL-MCNC: 166 MG/DL
TRIGL SERPL-MCNC: 147.8 MG/DL

## 2021-06-16 PROCEDURE — 80061 LIPID PANEL: CPT

## 2021-06-16 PROCEDURE — 83036 HEMOGLOBIN GLYCOSYLATED A1C: CPT

## 2021-06-16 PROCEDURE — 36415 COLL VENOUS BLD VENIPUNCTURE: CPT

## 2021-10-11 DIAGNOSIS — F32.A ANXIETY AND DEPRESSION: ICD-10-CM

## 2021-10-11 DIAGNOSIS — F41.9 ANXIETY AND DEPRESSION: ICD-10-CM

## 2021-10-11 RX ORDER — ESCITALOPRAM OXALATE 10 MG/1
10 TABLET ORAL DAILY
Qty: 90 TABLET | Refills: 0 | Status: CANCELLED | OUTPATIENT
Start: 2021-10-11